# Patient Record
Sex: MALE | Race: WHITE | Employment: OTHER | URBAN - METROPOLITAN AREA
[De-identification: names, ages, dates, MRNs, and addresses within clinical notes are randomized per-mention and may not be internally consistent; named-entity substitution may affect disease eponyms.]

---

## 2019-03-21 ENCOUNTER — HOSPITAL ENCOUNTER (OUTPATIENT)
Dept: PHYSICAL THERAPY | Age: 69
Discharge: HOME | End: 2019-03-21
Payer: COMMERCIAL

## 2019-03-21 PROCEDURE — 97162 PT EVAL MOD COMPLEX 30 MIN: CPT

## 2019-03-21 PROCEDURE — 97110 THERAPEUTIC EXERCISES: CPT

## 2019-03-21 PROCEDURE — 97016 VASOPNEUMATIC DEVICE THERAPY: CPT

## 2019-03-21 NOTE — THERAPY EVALUATION
Ramesh Hadrin  : 1950  Primary: [facility] Lacy [facility] [place]*  [place] [id number] Kenroy Mehta at [address]  [phone]   [phone]       OUTPATIENT PHYSICAL THERAPY:Initial Assessment 3/21/2019    ICD-10: Treatment Diagnosis 1: adhesive capsulitis of the right shoulder (M75.01)  Treatment Diagnosis 2: right shoulder pain (M25.511)  Precautions: Type I Diabetes - patient has an insulin pump  Allergies: Patient has no allergy information on record. MD Orders: Evaluate and Treat  MEDICAL/REFERRING DIAGNOSIS:  Adhesive capsulitis of right shoulder [M75.01]   DATE OF ONSET: pain started around 2019 and has been chronic since with adhesive capsulitis  REFERRING PHYSICIAN: Anat Barrett MD  RETURN PHYSICIAN APPOINTMENT: 2019     INITIAL ASSESSMENT:  Mr. Winter Fritz is a 79 y.o. male presenting to physical therapy with complaints of right adhesive capsulitis that started around 2019 and was recently diagnosed with per Dr Ismael Arreguin. He reports a history of left frozen shoulder om  which has completely resolved with full ROM with physical therapy and manipulation under anesthesia at that time. He reports difficult with raising the arm overhead, reaching behind his back, sleeping on that side, and lifting/carrying grandchildren. He is eager to improve ROM and limit disability from ROM loss of frozen shoulder. He is a good candidate for skilled intervention with services to include manual therapy, modalities as needed, therapeutic exercises, postural re-education, and activity modification. PROBLEM LIST (Impacting functional limitations):  1. Decreased Strength  2. Decreased ADL/Functional Activities  3. Increased Pain  4. Decreased Activity Tolerance  5. Increased Fatigue  6.  Decreased Flexibility/Joint Mobility INTERVENTIONS PLANNED: (Treatment may consist of any combination of the following)  1. Cold  2. Cryotherapy  3. Electrical Stimulation  4. Family Education  5. Heat  6. Home Exercise Program (HEP)  7. Manual Therapy  8. Neuromuscular Re-education/Strengthening  9. Range of Motion (ROM)  10. Therapeutic Exercise/Strengthening  11. Transcutaneous Electrical Nerve Stimulation (TENS)  12. Transfer Training  13. Ultrasound (US)   TREATMENT PLAN:  Effective Dates: 3/21/2019 TO 5/16/2019. Frequency/Duration: 1-2 times a week for 8 weeks  GOALS: (Goals have been discussed and agreed upon with patient.)  Short-Term Functional Goals: Time Frame: 3/21/2019 to 4/18/2019  1. [place] with home exercise program without verbal cuing by therapist.  2. Improve active flexion and abduction to at least 140 degrees in order to normalize overhead activity. 3. Improve Apley's IR to at least T10 in order to don/doff belt with less dysfunction. Discharge Goals: Time Frame: 3/21/2019 to 5/16/2019  1. Improve pain to 3/10 at the most with reaching behind the back, sleeping on the right side, and lifting/carrying grandchildren. 2. Improve active and passive elevation to at least 170 degrees in order to normalize overhead activites. 3. Improve Apley's IR to at least T8 in order to don/doff belt and insulin pump. 4. Improve ER (at 0 degrees) to 70 degrees and ER (at 90 degrees) to 90 degrees. 5. Improve strength grossly of glenohumeral and scapulothoracic joint by at least 1 full muscle grade. 6. Improve maintenance of ROM and proper progression of ROM at home in order for patient to transition to independent HEP. 7. Improve DASH to 15/55. Outcome Measure: Tool Used: Disabilities of the Arm, Shoulder and Hand (DASH) Questionnaire - Quick Version  Score:  Initial: 27/55  Most Recent: X/55 (Date: -- )   Interpretation of Score: The DASH is designed to measure the activities of daily living in person's with upper extremity dysfunction or pain.   Each section is scored on a 1-5 scale, 5 representing the greatest disability. The scores of each section are added together for a total score of 55. Medical Necessity:   · Patient is expected to demonstrate progress in range of motion, coordination and functional technique to improve overhead activities, lifting/carrying, behind the back activities, and reduce pain. · Skilled intervention continues to be required due to weakness, decreased ROM, functional limitations, and pain. Reason for Services/Other Comments:  · Patient continues to require skilled intervention due to increasing complexity of exercises. Total Evaluation Duration: 30 minutes    Rehabilitation Potential For Stated Goals: Good  Regarding Aga  therapy, I certify that the treatment plan above will be carried out by a therapist or under their direction. Thank you for this referral,  Adalberto Rivera PT     Referring Physician Signature: Griselda Koo MD              Date                     PAIN/SUBJECTIVE:    Initial: Pain Intensity 1: 7  Pain Location 1: Shoulder  Pain Orientation 1: Right  Post Session:  5/10    HISTORY:    History of Injury/Illness (Reason for Referral):  Mr. Zay Betts is a 79 y.o. male presenting to physical therapy with complaints of right adhesive capsulitis that started around January 1st 2019 and was recently diagnosed with per Dr Margot [place]. He reports a history of left frozen shoulder om 2014 which has completely resolved with full ROM with physical therapy and manipulation under anesthesia at that time. He reports difficult with raising the arm overhead, reaching behind his back, sleeping on that side, and lifting/carrying grandchildren. He is eager to improve ROM and limit disability from ROM loss of frozen shoulder. Past Medical History/Comorbidities:   Mr. Zay Betts has a past medical history of Type I Diabetes, hyperlipidemia, and hypertension all controlled with medication.   Social History/Living Environment:    Patient lives at home with spouse in a two story home and reports assistance from her with any painful activities. Prior Level of Function/Work/Activity:  Independent without dysfunction. Patient is retired. He is very active with grandchildren and activities around the house. Dominant Side:         RIGHT    Ambulatory/Rehab Services H2 Model Falls Risk Assessment    Risk Factors:       (1)  Gender [Male] Ability to Rise from Chair:       (1)  Pushes up, successful in one attempt    Falls Prevention Plan:       No modifications necessary    Total: (5 or greater = High Risk): 2    ©2010 [facility] [employer]. All Rights Reserved. [facility] Patent #[id number]. Federal Law prohibits the replication, distribution or use without written permission from [facility] [employer]    Current Medications:      No current outpatient medications on file. Date Last Reviewed:  3/21/2019    Number of Personal Factors/Comorbidities that affect the Plan of Care (history of type I diabetes): 1-2: MODERATE COMPLEXITY    EXAMINATION:      Observation/Postural and Gait Assessment:  Patient sits with significant forward head, forward shoulders, and thoracic kyphosis that are reversible with cuing. Visual inspection of shoulder reveals no significant deformity. Palpation:  Gross tenderness to palpation of right shoulder. Flexibility significiantly limited of pectoralis minor and major on the right. ROM: AROM to 130 degrees on the right with pain at end range with moderate scapular hike, 170 degrees on the left without scapular hike. PROM in degrees Left Right   Shoulder flexion 180° 140°   Shoulder abduction  180° 140°   Shoulder internal rotation (IR)  (measured at 45 ° abduction) 70° 40°   Shoulder external rotation (ER)  (measured 0° abduction) 70° 45°   ER (measured at 90° degrees of abduction) 90° 70°   Apley's scratch test (functional IR AROM) T8 sacrum     Passive Accessory Motion: Severe limitations in all planes.     Strength:   Manual Muscle Test (out of 5) Left Right   Shoulder scaption 4 4   Shoulder ER 4 4+   Shoulder IR 4 4+   Teres Minor 4 4   Infraspinatus 4 4     Special Tests:  ROM passively and actively limited in all planes which is indicative of adhesive capsulitis. Neurological Screen:  Myotomes: Key muscle strength testing for bilateral UE is WNL. Dermatomes: Sensation testing through bilateral UE for light touch is WNL. Functional Mobility:  Patient is safe and independent with most functional mobility but requires assistance with some heavy lifting and carrying. Body Structures Involved:  1. Joints  2. Muscles  3. Ligaments Body Functions Affected:  1. Sensory/Pain  2. Neuromusculoskeletal Activities and Participation Affected:  1. Learning and Applying Knowledge  2. General Tasks and Demands  3. Mobility  4. Self Care  5. Domestic Life  6.  Community, Social and [place]    Number of elements (examined above) that affect the Plan of Care: 3: MODERATE COMPLEXITY    CLINICAL PRESENTATION:    Presentation: Evolving clinical presentation with changing clinical characteristics: MODERATE COMPLEXITY    CLINICAL DECISION MAKING:    Use of outcome tool(s) and clinical judgement create a POC that gives a: Questionable prediction of patient's progress: MODERATE COMPLEXITY

## 2019-03-21 NOTE — PROGRESS NOTES
Frandy Bazzi  : 1950  Primary: Tyronbeatriz Peng [facility] [place]*  [place]: [place] [id number] [address] at [address], [address] [place] [address]  [phone]   [phone]      OUTPATIENT PHYSICAL THERAPY: Daily Treatment Note 3/21/2019    Pre-treatment Symptoms/Complaints:  Patient reported being eager to improve overall ROM. Pain: Initial: Pain Intensity 1: 7  Pain Location 1: Shoulder  Pain Orientation 1: Right  Post Session:  5/10   Medications Last Reviewed:  3/21/2019  Precautions: Type I Diabetes - patient has an insulin pump  Date of Onset: pain started around 2019 and has been chronic since with adhesive capsulitis  Updated Objective Findings:  See evaluation note from today   TREATMENT:   THERAPEUTIC EXERCISE: (15 minutes):  Exercises per grid below to improve mobility, strength, balance and coordination. Required minimal visual, verbal and manual cues to promote proper body alignment, promote proper body posture and promote proper body mechanics. Progressed resistance, range, repetitions and complexity of movement as indicated.      Date:  3/21/2019 Date:   Date:     Activity/Exercise Parameters Parameters Parameters   Supine stick external rotation 10 sec x 10     Supine stick flexion 10 sec x 10     pulleys 10 sec x 10     Ball roll seated 10 sec x 10     Wall walk 10 sec x 10     Strap IR 5 x 30 sec               MANUAL THERAPY: (0 minutes): Joint mobilization and Soft tissue mobilization was utilized and necessary because of the patient's restricted joint motion, loss of articular motion and restricted motion of soft tissue   · Will consider supine PROM in all planes to improve ROM next session    MODALITIES: (15 minutes):      vasopneumatic in sitting to right shoulder with pillow under right arm      Treatment/Session Summary:    · Response to Treatment:  Patient reported no complaints with session and reported a good understanding of HEP. Will see patient 1 day a week per his request and will progress exercises and assess reponse to exercise. .  · Communication/Consultation:  None today  · Equipment provided today:  Patient was provided a set of over the door pulleys for home use today. · Recommendations/Intent for next treatment session: Next visit will focus on continued progression and maintenance of AROM and PROM.   Treatment Plan of Care Effective Dates: 3/21/2019 to 5/16/2019    Total Treatment Billable Duration:  60 minutes: 30 evaluation, 15 minutes exercises, 15 minutes vasopneumatic  PT Patient Time In/Time Out  Time In: 1100  Time Out: 1700 [facility], PT

## 2019-03-29 ENCOUNTER — HOSPITAL ENCOUNTER (OUTPATIENT)
Dept: PHYSICAL THERAPY | Age: 69
Discharge: HOME | End: 2019-03-29
Payer: COMMERCIAL

## 2019-03-29 PROCEDURE — 97016 VASOPNEUMATIC DEVICE THERAPY: CPT

## 2019-03-29 PROCEDURE — 97140 MANUAL THERAPY 1/> REGIONS: CPT

## 2019-03-29 PROCEDURE — 97035 APP MDLTY 1+ULTRASOUND EA 15: CPT

## 2019-03-29 PROCEDURE — 97110 THERAPEUTIC EXERCISES: CPT

## 2019-03-29 NOTE — PROGRESS NOTES
Inocencio Hodgson Lalaoggs  : 1950  Primary: Dillon Swift Of [place]*  [place] [id number] Kenroy Mehta at [address]  [phone]   [phone]      OUTPATIENT PHYSICAL THERAPY: Daily Treatment Note 3/29/2019    Pre-treatment Symptoms/Complaints:  Patient reported continued pain in the shoulder but ROM seems to be improving. Pain: Initial: Pain Intensity 1: 4  Pain Location 1: Shoulder  Pain Orientation 1: Right  Post Session:  5/10   Medications Last Reviewed:  3/29/2019  Precautions: Type I Diabetes - patient has an insulin pump  Date of Onset: pain started around 2019 and has been chronic since with adhesive capsulitis  Updated Objective Findings:  see table below    left right   External rotation (0 degrees abduction) 40 45   External rotation (90 degrees abduction) 80 70   Flexion  150 140   abduction 150 140   IR 60 40      TREATMENT:   THERAPEUTIC EXERCISE: (15 minutes):  Exercises per grid below to improve mobility, strength, balance and coordination. Required minimal visual, verbal and manual cues to promote proper body alignment, promote proper body posture and promote proper body mechanics. Progressed resistance, range, repetitions and complexity of movement as indicated.      Date:  3/21/2019 Date:  3/29/2019 Date:     Activity/Exercise Parameters Parameters Parameters   Supine stick external rotation 10 sec x 10 10 sec x 10    Supine stick flexion 10 sec x 10 10 sec x 10    pulleys 10 sec x 10 10 sec x 10    Ball roll seated 10 sec x 10 10 sec x 10    Wall walk 10 sec x 10 10 sec x 10    Strap IR 5 x 30 sec 5 x 30 sec              MANUAL THERAPY: (15 minutes): Joint mobilization and Soft tissue mobilization was utilized and necessary because of the patient's restricted joint motion, loss of articular motion and restricted motion of soft tissue   · Will consider supine PROM in all planes to improve ROM next session    MODALITIES:   · Ultrasound (10 minutes): *  Ultrasound was used today secondary to the patient having tightened structures limiting joint motion that require an increase in extensibility. Ultrasound was used today to reduce joint stiffness, increase muscle flexibility, increase tendon flexibility and increase ligament flexibility. in sitting to right shoulder, intensity 1.4, frequency 1   · Vasopneumatic (15 minutes): in sitting to right shoulder to reduce pain and improve inflammation and swelling from stretching due to frozen shoulder      Treatment/Session Summary:    · Response to Treatment:  Patient tolerated session well and reported no complaints. Required some cues to proper form with exercises - patient is only 1 day a week for therapy sessions. Continue to progress as tolerated. .  · Communication/Consultation:  None today  · Equipment provided today:  Patient was provided a set of over the door pulleys for home use today. · Recommendations/Intent for next treatment session: Next visit will focus on continued progression and maintenance of AROM and PROM.   Treatment Plan of Care Effective Dates: 3/21/2019 to 5/16/2019    Total Treatment Billable Duration:  55 minutes  PT Patient Time In/Time Out  Time In: 1055  Time Out: Rosey 226, PT

## 2019-04-05 ENCOUNTER — HOSPITAL ENCOUNTER (OUTPATIENT)
Dept: PHYSICAL THERAPY | Age: 69
Discharge: HOME | End: 2019-04-05
Payer: COMMERCIAL

## 2019-04-05 NOTE — PROGRESS NOTES
Patient reported he would be on vacation today and would not make it to therapy.     Gabriela Barksdale, MARIMART

## 2019-04-12 ENCOUNTER — HOSPITAL ENCOUNTER (OUTPATIENT)
Dept: PHYSICAL THERAPY | Age: 69
Discharge: HOME | End: 2019-04-12
Payer: COMMERCIAL

## 2019-04-12 PROCEDURE — 97140 MANUAL THERAPY 1/> REGIONS: CPT

## 2019-04-12 PROCEDURE — 97035 APP MDLTY 1+ULTRASOUND EA 15: CPT

## 2019-04-12 PROCEDURE — 97110 THERAPEUTIC EXERCISES: CPT

## 2019-04-12 NOTE — PROGRESS NOTES
Maxim Hardin  : 1950  Primary: [facility] [place]*  [place]: [place] [id number] [address] at [address]  [address] [facility] [place], [address] [place] [address]  [phone]   [phone]      OUTPATIENT PHYSICAL THERAPY: Daily Treatment Note 2019    Pre-treatment Symptoms/Complaints:  Patient reports slightly decreased pain past few days . Pain: Initial: Pain Intensity 1: 2  Pain Location 1: Shoulder  Pain Orientation 1: Right  Post Session:  2/10   Medications Last Reviewed:  2019  Precautions: Type I Diabetes - patient has an insulin pump  Date of Onset: pain started around 2019 and has been chronic since with adhesive capsulitis  Updated Objective Findings:  see table below    left right   External rotation (0 degrees abduction) 40 45   External rotation (90 degrees abduction) 80 70   Flexion  150 140   abduction 150 140   IR 60 40      TREATMENT:   THERAPEUTIC EXERCISE: (20 minutes):  Exercises per grid below to improve mobility, strength, balance and coordination. Required minimal visual, verbal and manual cues to promote proper body alignment, promote proper body posture and promote proper body mechanics. Progressed resistance, range, repetitions and complexity of movement as indicated.      Date:  3/21/2019 Date:  3/29/2019 Date:  19   Activity/Exercise Parameters Parameters Parameters   Supine stick external rotation 10 sec x 10 10 sec x 10 10 sec x 10   Supine stick flexion 10 sec x 10 10 sec x 10 10 sec x 10   pulleys 10 sec x 10 10 sec x 10 2 x 10 sec x 10   Ball roll seated 10 sec x 10 10 sec x 10 10 sec x 10   Wall walk 10 sec x 10 10 sec x 10 10 x 10 sec   Strap IR 5 x 30 sec 5 x 30 sec 6 x 30 sec   Wand abduction   1 X 10                          MANUAL THERAPY: (25 minutes): Joint mobilization and Soft tissue mobilization was utilized and necessary because of the patient's restricted joint motion, loss of articular motion and restricted motion of soft tissue   ·  Supine PROM in all planes to improve ROM   · Soft tissue mobilization to gross shoulder musculature in sitting    MODALITIES:   · Ultrasound (10 minutes): *  Ultrasound was used today secondary to the patient having tightened structures limiting joint motion that require an increase in extensibility. Ultrasound was used today to reduce joint stiffness, increase muscle flexibility, increase tendon flexibility and increase ligament flexibility. in sitting to right shoulder, intensity 1.4, frequency 1   · Vasopneumatic (0 minutes): in sitting to right shoulder to reduce pain and improve inflammation and swelling from stretching due to frozen shoulder      Treatment/Session Summary:    · Response to Treatment:  No C/O with exercises. improving ROM. · Communication/Consultation:  None today  · Equipment provided today:  None today  · Recommendations/Intent for next treatment session: Next visit will focus on continued progression and maintenance of AROM and PROM.   Treatment Plan of Care Effective Dates: 3/21/2019 to 5/16/2019    Total Treatment Billable Duration:  55 minutes  PT Patient Time In/Time Out  Time In: 1055  Time Out: ANASTACIO Mooney

## 2019-04-19 ENCOUNTER — HOSPITAL ENCOUNTER (OUTPATIENT)
Dept: PHYSICAL THERAPY | Age: 69
Discharge: HOME | End: 2019-04-19
Payer: COMMERCIAL

## 2019-04-19 PROCEDURE — 97016 VASOPNEUMATIC DEVICE THERAPY: CPT

## 2019-04-19 PROCEDURE — 97140 MANUAL THERAPY 1/> REGIONS: CPT

## 2019-04-19 PROCEDURE — 97035 APP MDLTY 1+ULTRASOUND EA 15: CPT

## 2019-04-19 PROCEDURE — 97110 THERAPEUTIC EXERCISES: CPT

## 2019-04-19 NOTE — PROGRESS NOTES
Flako Brandon  : 1950  Primary: Jaylene Huyen [facility]  [place]: [place] [id number] [address] at [address], [address]  [phone]   [phone]      OUTPATIENT PHYSICAL THERAPY: Daily Treatment Note 2019    Pre-treatment Symptoms/Complaints:  Patient reports increased tightness . Pain: Initial: Pain Intensity 1: 2  Pain Location 1: Shoulder  Pain Orientation 1: Right  Post Session:  0/10   Medications Last Reviewed:  2019  Precautions: Type I Diabetes - patient has an insulin pump  Date of Onset: pain started around 2019 and has been chronic since with adhesive capsulitis  Updated Objective Findings:  see table below    left right   External rotation (0 degrees abduction) 40 45   External rotation (90 degrees abduction) 80 70   Flexion  150 140   abduction 150 140   IR 60 40      TREATMENT:   THERAPEUTIC EXERCISE: (20 minutes):  Exercises per grid below to improve mobility, strength, balance and coordination. Required minimal visual, verbal and manual cues to promote proper body alignment, promote proper body posture and promote proper body mechanics. Progressed resistance, range, repetitions and complexity of movement as indicated.      Date:  19 Date:  3/29/2019 Date:  19   Activity/Exercise Parameters Parameters Parameters   Supine stick external rotation 10 sec x 10 10 sec x 10 10 sec x 10   Supine stick flexion 10 sec x 10 10 sec x 10 10 sec x 10   pulleys 2 min flex  2 min scaption 10 sec x 10 2 x 10 sec x 10   Ball roll seated Standing 10 sec x 10 10 sec x 10 10 sec x 10   Wall walk 10 sec x 10 10 sec x 10 10 x 10 sec   Strap IR 5 x 30 sec 5 x 30 sec 6 x 30 sec   Wand abduction 1 x 10  1 X 10                          MANUAL THERAPY: (15 minutes): Joint mobilization and Soft tissue mobilization was utilized and necessary because of the patient's restricted joint motion, loss of articular motion and restricted motion of soft tissue   ·  Supine PROM in all planes to improve ROM   ·     MODALITIES:   · Ultrasound (10 minutes): *  Ultrasound was used today secondary to the patient having tightened structures limiting joint motion that require an increase in extensibility. Ultrasound was used today to reduce joint stiffness, increase muscle flexibility, increase tendon flexibility and increase ligament flexibility. in sitting to right shoulder, intensity 1.4, frequency 1   · Vasopneumatic (15 minutes): in sitting to right shoulder to reduce pain and improve inflammation and swelling from stretching due to frozen shoulder  MHP to right shoulder in sitting x 10 minutes sitting    Treatment/Session Summary:    · Response to Treatment:  No C/O with exercises. improving ROM. · Communication/Consultation:  None today  · Equipment provided today:  None today  · Recommendations/Intent for next treatment session: Next visit will focus on continued progression and maintenance of AROM and PROM.   Treatment Plan of Care Effective Dates: 3/21/2019 to 5/16/2019    Total Treatment Billable Duration:  60 minutes  PT Patient Time In/Time Out  Time In: 1000  Time Out: 1000 [facility]

## 2019-04-26 ENCOUNTER — HOSPITAL ENCOUNTER (OUTPATIENT)
Dept: PHYSICAL THERAPY | Age: 69
Discharge: HOME | End: 2019-04-26
Payer: COMMERCIAL

## 2019-04-26 PROCEDURE — 97110 THERAPEUTIC EXERCISES: CPT

## 2019-04-26 PROCEDURE — 97140 MANUAL THERAPY 1/> REGIONS: CPT

## 2019-04-26 NOTE — PROGRESS NOTES
Olga Reevesgs  : 1950  Primary: Gabby Christy [place]  [place] [id number] at [address], [address]  [phone]   [phone]      OUTPATIENT PHYSICAL THERAPY: Daily Treatment Note 2019    Pre-treatment Symptoms/Complaints:  Patient reported he still has difficulty threading his belt through the loops of his pants. Pain: Initial: Pain Intensity 1: 4  Pain Location 1: Shoulder  Pain Orientation 1: Right  Post Session:  1/10 and decreased shoulder tightness   Medications Last Reviewed:  2019  Precautions: Type I Diabetes - patient has an insulin pump  Date of Onset: pain started around 2019 and has been chronic since with adhesive capsulitis  Updated Objective Findings:  Pt. continues to gain range of motion in right shoulder. left right   External rotation (0 degrees abduction) 40 45   External rotation (90 degrees abduction) 80 70   Flexion  150 140   abduction 150 140   IR 60 40      TREATMENT:   THERAPEUTIC EXERCISE: (30 minutes):  Exercises per grid below to improve mobility, strength, balance and coordination. Required minimal visual, verbal and manual cues to promote proper body alignment, promote proper body posture and promote proper body mechanics. Progressed resistance, range, repetitions and complexity of movement as indicated. Date:  19 Date:  2619 Date:  19   Activity/Exercise Parameters Parameters Parameters   Supine stick external rotation 10 sec x 10 10 sec x 10 10 sec x 10   Supine stick flexion 10 sec x 10 10 sec x 10 10 sec x 10   pulleys 2 min flex  2 min scaption 2 mins flexion & 2 mins scaption 2 x 10 sec x 10   Ball roll seated Standing 10 sec x 10 10 sec x 10 10 sec x 10   Wall walk 10 sec x 10 10 sec x 10 10 x 10 sec   Strap IR 5 x 30 sec 5 x 30 sec 6 x 30 sec   Wand abduction 1 x 10 X 10 reps  1 X 10    UBE   Level 4 x 3 mins fwd & 3 mins bkwd. MANUAL THERAPY: (15 minutes): Joint mobilization and Soft tissue mobilization was utilized and necessary because of the patient's restricted joint motion, loss of articular motion and restricted motion of soft tissue   ·  Supine PROM in all planes to improve ROM   ·     MODALITIES:   · Ultrasound (0 minutes): *  Ultrasound was used today secondary to the patient having tightened structures limiting joint motion that require an increase in extensibility. Ultrasound was used today to reduce joint stiffness, increase muscle flexibility, increase tendon flexibility and increase ligament flexibility. in sitting to right shoulder, intensity 1.4, frequency 1   · Vasopneumatic (15 minutes): in sitting to right shoulder to reduce pain and improve inflammation and swelling from stretching due to frozen shoulder (NO CHARGE FOR VASO)    Treatment/Session Summary:    · Response to Treatment:  Pt. was compliant with all exercises and reported decreased pain tightness in right shoulder. .  · Communication/Consultation:  None today  · Equipment provided today:  None today  · Recommendations/Intent for next treatment session: Next visit will focus on continued progression and maintenance of AROM and PROM.   Treatment Plan of Care Effective Dates: 3/21/2019 to 5/16/2019    Total Treatment Billable Duration:  60 minutes  PT Patient Time In/Time Out  Time In: 1100  Time Out: [address] Kari Marx, PTA

## 2019-05-03 ENCOUNTER — HOSPITAL ENCOUNTER (OUTPATIENT)
Dept: PHYSICAL THERAPY | Age: 69
Discharge: HOME | End: 2019-05-03
Payer: COMMERCIAL

## 2019-05-03 PROCEDURE — 97140 MANUAL THERAPY 1/> REGIONS: CPT

## 2019-05-03 PROCEDURE — 97110 THERAPEUTIC EXERCISES: CPT

## 2019-05-03 NOTE — PROGRESS NOTES
Yun Leal  : 1950  [place] Kofi Joy [place]*  [place]: [place] [id number] at [address], [place], [address] [place] [address]  [phone]   [phone]      OUTPATIENT PHYSICAL THERAPY: Daily Treatment Note 5/3/2019    Pre-treatment Symptoms/Complaints:  Patient states reaching behind his back is main C/O  Pain: Initial: Pain Intensity 1: 5  Pain Location 1: Shoulder  Pain Orientation 1: Right  Post Session:  1/10 and decreased shoulder tightness   Medications Last Reviewed:  5/3/2019  Precautions: Type I Diabetes - patient has an insulin pump  Date of Onset: pain started around 2019 and has been chronic since with adhesive capsulitis  Updated Objective Findings:  Pt. continues to gain range of motion in right shoulder. left right   External rotation (0 degrees abduction) 40 45   External rotation (90 degrees abduction) 80 70   Flexion  150 140   abduction 150 140   IR 60 40      TREATMENT:   THERAPEUTIC EXERCISE: (30 minutes):  Exercises per grid below to improve mobility, strength, balance and coordination. Required minimal visual, verbal and manual cues to promote proper body alignment, promote proper body posture and promote proper body mechanics. Progressed resistance, range, repetitions and complexity of movement as indicated. Date:  19 Date:  2619 Date:  5-3-19   Activity/Exercise Parameters Parameters Parameters   Supine stick external rotation 10 sec x 10 10 sec x 10 10 sec x 10   Supine stick flexion 10 sec x 10 10 sec x 10 10 sec x 10   pulleys 2 min flex  2 min scaption 2 mins flexion & 2 mins scaption 2 min flexion  2 min scaption   Ball roll seated Standing 10 sec x 10 10 sec x 10 10 x 10 sec   Wall walk 10 sec x 10 10 sec x 10 10 x 10 sec   Strap IR 5 x 30 sec 5 x 30 sec 5 x 30 sec   Wand abduction 1 x 10 X 10 reps  1 x 10   UBE   Level 4 x 3 mins fwd & 3 mins bkwd.   Level 5 4 min forward  4 min reverse                   MANUAL THERAPY: (15 minutes): Joint mobilization and Soft tissue mobilization was utilized and necessary because of the patient's restricted joint motion, loss of articular motion and restricted motion of soft tissue   ·  Supine PROM in all planes to improve ROM   ·     MODALITIES:   · Ultrasound (0 minutes): *  Ultrasound was used today secondary to the patient having tightened structures limiting joint motion that require an increase in extensibility. Ultrasound was used today to reduce joint stiffness, increase muscle flexibility, increase tendon flexibility and increase ligament flexibility. in sitting to right shoulder, intensity 1.4, frequency 1   · Vasopneumatic (10 minutes): in sitting to right shoulder to reduce pain and inflammation (NO CHARGE FOR VASO)    Treatment/Session Summary:    · Response to Treatment:  Improved ROM after session. · Communication/Consultation:  None today  · Equipment provided today:  None today  · Recommendations/Intent for next treatment session: Next visit will focus on continued progression and maintenance of AROM and PROM.   Treatment Plan of Care Effective Dates: 3/21/2019 to 5/16/2019    Total Treatment Billable Duration:  55 minutes  PT Patient Time In/Time Out  Time In: 1053  Time Out: [address] [facility], [facility]

## 2019-05-10 ENCOUNTER — HOSPITAL ENCOUNTER (OUTPATIENT)
Dept: PHYSICAL THERAPY | Age: 69
Discharge: HOME | End: 2019-05-10
Payer: COMMERCIAL

## 2019-05-10 PROCEDURE — 97110 THERAPEUTIC EXERCISES: CPT

## 2019-05-10 PROCEDURE — 97140 MANUAL THERAPY 1/> REGIONS: CPT

## 2019-05-10 PROCEDURE — 97035 APP MDLTY 1+ULTRASOUND EA 15: CPT

## 2019-05-10 NOTE — PROGRESS NOTES
Slick Reevesognatali  : 1950  Primary: Marcus Siddiqui [place]*  [place] [id number] at [address]  [phone]   [phone]      OUTPATIENT PHYSICAL THERAPY: Daily Treatment Note 5/10/2019    Pre-treatment Symptoms/Complaints:  Patient reports overall progress however continues to have difficulty reaching behind his back. Pain: Initial: Pain Intensity 1: 3  Pain Location 1: Shoulder  Pain Orientation 1: Right  Post Session:  1/10    Medications Last Reviewed:  5/10/2019  Precautions: Type I Diabetes - patient has an insulin pump  Date of Onset: pain started around 2019 and has been chronic since with adhesive capsulitis  Updated Objective Findings:  None Today    left right   External rotation (0 degrees abduction) 40 45   External rotation (90 degrees abduction) 80 70   Flexion  150 140   abduction 150 140   IR 60 40      TREATMENT:   THERAPEUTIC EXERCISE: (20 minutes):  Exercises per grid below to improve mobility, strength, balance and coordination. Required minimal visual, verbal and manual cues to promote proper body alignment, promote proper body posture and promote proper body mechanics. Progressed resistance, range, repetitions and complexity of movement as indicated. Date:  5-10-19 Date:  2619 Date:  5-3-19   Activity/Exercise Parameters Parameters Parameters   Supine stick external rotation  10 sec x 10 10 sec x 10   Supine stick flexion  10 sec x 10 10 sec x 10   pulleys 2 min flexion  2 min scaption 2 mins flexion & 2 mins scaption 2 min flexion  2 min scaption   Ball roll seated  10 sec x 10 10 x 10 sec   Wall walk 10 x 10 sec 10 sec x 10 10 x 10 sec   Strap IR 5 x 30 sec 5 x 30 sec 5 x 30 sec   Wand abduction 1 x 12 X 10 reps  1 x 10   UBE  3 min forward  3 min reverse Level 4 x 3 mins fwd & 3 mins bkwd.   Level 5 4 min forward  4 min reverse                   MANUAL THERAPY: (15 minutes): Joint mobilization and Soft tissue mobilization was utilized and necessary because of the patient's restricted joint motion, loss of articular motion and restricted motion of soft tissue   ·  Supine PROM in all planes to improve ROM   ·     MODALITIES:   · Ultrasound (10 minutes): *  Ultrasound was used today secondary to the patient having tightened structures limiting joint motion that require an increase in extensibility. Ultrasound was used today to reduce joint stiffness, increase muscle flexibility, increase tendon flexibility and increase ligament flexibility. in sitting to right shoulder, intensity 1.4, frequency 1   · Vasopneumatic (10 minutes): in sitting to right shoulder to reduce pain and inflammation (NO CHARGE FOR VASO)    Treatment/Session Summary:    · Response to Treatment:  Improved ROM after session. · Communication/Consultation:  None today  · Equipment provided today:  None today  · Recommendations/Intent for next treatment session: Next visit will focus on continued progression and maintenance of AROM and PROM.   Treatment Plan of Care Effective Dates: 3/21/2019 to 5/16/2019    Total Treatment Billable Duration:  55 minutes  PT Patient Time In/Time Out  Time In: 1055  Time Out: [address] Louis Valentino, ANASTACIO

## 2019-05-13 ENCOUNTER — HOSPITAL ENCOUNTER (OUTPATIENT)
Dept: PHYSICAL THERAPY | Age: 69
Discharge: HOME | End: 2019-05-13
Payer: COMMERCIAL

## 2019-05-13 PROCEDURE — 97140 MANUAL THERAPY 1/> REGIONS: CPT

## 2019-05-13 PROCEDURE — 97035 APP MDLTY 1+ULTRASOUND EA 15: CPT

## 2019-05-13 PROCEDURE — 97110 THERAPEUTIC EXERCISES: CPT

## 2019-05-13 NOTE — PROGRESS NOTES
Tunde Hardin  : 1950  Primary: Lana Garcia [place]*  [place]: [place] [id number] at [address], [place], [address] [place] [address]  [phone]   [phone]      OUTPATIENT PHYSICAL THERAPY: Daily Treatment Note 2019    Pre-treatment Symptoms/Complaints:  Patient reports overall progress by at least 90% and feels ready for discharge today. Pain: Initial: Pain Intensity 1: 1  Pain Location 1: Shoulder  Pain Orientation 1: Right  Post Session:  0-1/10    Medications Last Reviewed:  2019  Precautions: Type I Diabetes - patient has an insulin pump  Date of Onset: pain started around 2019 and has been chronic since with adhesive capsulitis  Updated Objective Findings:  None Today    left right   External rotation (0 degrees abduction) 40 45   External rotation (90 degrees abduction) 80 90 (from 70)   Flexion  150 150 (From 140)   abduction 150 150 (From 140)   IR 60 60 (from 40)      TREATMENT:   THERAPEUTIC EXERCISE: (30 minutes):  Exercises per grid below to improve mobility, strength, balance and coordination. Required minimal visual, verbal and manual cues to promote proper body alignment, promote proper body posture and promote proper body mechanics. Progressed resistance, range, repetitions and complexity of movement as indicated. Date:  5-10-19 Date:  2019 Date:  5-3-19   Activity/Exercise Parameters Parameters Parameters   Supine stick external rotation  10 sec x 10 10 sec x 10   Supine stick flexion  10 sec x 10 10 sec x 10   pulleys 2 min flexion  2 min scaption 2 mins flexion & 2 mins scaption 2 min flexion  2 min scaption   Ball roll seated  10 sec x 10 10 x 10 sec   Wall walk 10 x 10 sec 10 sec x 10 10 x 10 sec   Strap IR 5 x 30 sec 5 x 30 sec 5 x 30 sec   Wand abduction 1 x 12 X 10 reps  1 x 10   UBE  3 min forward  3 min reverse Level 4 x 3 mins fwd & 3 mins bkwd.   Level 5 4 min forward  4 min reverse                   MANUAL THERAPY: (20 minutes): Joint mobilization and Soft tissue mobilization was utilized and necessary because of the patient's restricted joint motion, loss of articular motion and restricted motion of soft tissue   ·  Supine PROM in all planes to improve ROM   · Supine posterior and inferior joint mobilization in neutral grade III-IV    MODALITIES:   · Ultrasound (10 minutes): *  Ultrasound was used today secondary to the patient having tightened structures limiting joint motion that require an increase in extensibility. Ultrasound was used today to reduce joint stiffness, increase muscle flexibility, increase tendon flexibility and increase ligament flexibility. in sitting to right shoulder, intensity 1.4, frequency 1     Treatment/Session Summary:    · Response to Treatment:  Patient tolerated session well and reported feeling ready for discharge today. .  · Communication/Consultation:  None today  · Equipment provided today:  None today  · Recommendations/Intent for next treatment session: Patient is discharged from therapy at this time.   Treatment Plan of Care Effective Dates: 3/21/2019 to 5/16/2019    Total Treatment Billable Duration:  55 minutes  PT Patient Time In/Time Out  Time In: 1000  Time Out: 1200 Chris Singh, PT

## 2019-05-13 NOTE — THERAPY DISCHARGE
Germaine Reeveschandrakantnatali  : 1950  Primary: Ana Clancy [place]*  [place] [id number] [address] at [address], [address] [place] [address] Street  [phone]   [phone]       OUTPATIENT PHYSICAL [facility] 2019    ICD-10: Treatment Diagnosis 1: adhesive capsulitis of the right shoulder (M75.01)  Treatment Diagnosis 2: right shoulder pain (M25.511)  Precautions: Type I Diabetes - patient has an insulin pump  Allergies: Patient has no allergy information on record. MD Orders: Evaluate and Treat  MEDICAL/REFERRING DIAGNOSIS:  Adhesive capsulitis of right shoulder [M75.01]   DATE OF ONSET: pain started around 2019 and has been chronic since with adhesive capsulitis  REFERRING PHYSICIAN: Isabell Boast, MD  RETURN PHYSICIAN APPOINTMENT: 2019     INITIAL ASSESSMENT:  Mr. Sonali Arita is a 79 y.o. male presenting to physical therapy with complaints of right adhesive capsulitis that started around 2019 and was recently diagnosed with per Dr Randee Doyle. He reports a history of left frozen shoulder om  which has completely resolved with full ROM with physical therapy and manipulation under anesthesia at that time. He reports difficult with raising the arm overhead, reaching behind his back, sleeping on that side, and lifting/carrying grandchildren. He is eager to improve ROM and limit disability from ROM loss of frozen shoulder. Patient has been seen for 8 sessions of therapy from 3/21/2019 to 2019 with significant success. He reports less pain in the shoulder and steady improvements in ROM especially behind the back. He has met most preset goals and is discharged from therapy at this time. PROBLEM LIST (Impacting functional limitations):  1. Decreased Strength  2. Decreased ADL/Functional Activities  3. Increased Pain  4. Decreased Activity Tolerance  5. Increased Fatigue  6.  Decreased Flexibility/Joint Mobility INTERVENTIONS PLANNED: (Treatment may consist of any combination of the following)  1. Cold  2. Cryotherapy  3. Electrical Stimulation  4. Family Education  5. Heat  6. Home Exercise Program (HEP)  7. Manual Therapy  8. Neuromuscular Re-education/Strengthening  9. Range of Motion (ROM)  10. Therapeutic Exercise/Strengthening  11. Transcutaneous Electrical Nerve Stimulation (TENS)  12. Transfer Training  13. Ultrasound (US)   TREATMENT PLAN:  Effective Dates: 3/21/2019 TO 5/16/2019. Frequency/Duration: Patient has been seen for 8 sessions of therapy from 3/21/2019 to 5/13/2019 with significant success. He reports less pain in the shoulder and steady improvements in ROM especially behind the back. He has met most preset goals and is discharged from therapy at this time. GOALS: (Goals have been discussed and agreed upon with patient.)  Short-Term Functional Goals: Time Frame: 5/13/2019 to 4/18/2019  1. [place] with home exercise program without verbal cuing by therapist. - GOAL MET  2. Improve active flexion and abduction to at least 140 degrees in order to normalize overhead activity. - GOAL MET  3. Improve Apley's IR to at least T10 in order to don/doff belt with less dysfunction. - GOAL MET  Discharge Goals: Time Frame: 5/13/2019 to 5/16/2019  1. Improve pain to 3/10 at the most with reaching behind the back, sleeping on the right side, and lifting/carrying grandchildren. - GOAL MET  2. Improve active and passive elevation to at least 170 degrees in order to normalize overhead activites. - ALMOST MET  3. Improve Apley's IR to at least T8 in order to don/doff belt and insulin pump. - NOT MET  4. Improve ER (at 0 degrees) to 70 degrees and ER (at 90 degrees) to 90 degrees. - ALMOST MET  5. Improve strength grossly of glenohumeral and scapulothoracic joint by at least 1 full muscle grade. - GOAL MET  6.  Improve maintenance of ROM and proper progression of ROM at home in order for patient to transition to independent HEP. - GOAL MET  7. Improve DASH to 15/55. - NOT MET    Outcome Measure: Tool Used: Disabilities of the Arm, Shoulder and Hand (DASH) Questionnaire - Quick Version  Score:  Initial: 27/55  Most Recent: 27/55 (Date: 523/1870)   Interpretation of Score: The DASH is designed to measure the activities of daily living in person's with upper extremity dysfunction or pain. Each section is scored on a 1-5 scale, 5 representing the greatest disability. The scores of each section are added together for a total score of 55. OBJECTIVE MEASUREMENTS:  Observation/Postural and Gait Assessment:  Patient sits with moderate (from significant) forward head, forward shoulders, and thoracic kyphosis that are reversible with cuing. Visual inspection of shoulder reveals no significant deformity. Palpation:  Decreased gross tenderness to palpation of right shoulder. Flexibility moderately (From significiantly) limited of pectoralis minor and major on the right. ROM: AROM to 130 degrees on the right with pain at end range with moderate scapular hike, 170 degrees on the left without scapular hike. PROM in degrees Left Right   Shoulder flexion 180° 150 (from 140°)   Shoulder abduction  180° 150 (From 140°)   Shoulder internal rotation (IR)  (measured at 45 ° abduction) 70° 60 (From 40°)   Shoulder external rotation (ER)  (measured 0° abduction) 70° 45°   ER (measured at 90° degrees of abduction) 90° 90 (From 70°)   Apley's scratch test (functional IR AROM) T8 T10 (from sacrum)     Passive Accessory Motion: Moderate (From moderate to severe) limitations in all planes. Strength:   Manual Muscle Test (out of 5) Left Right   Shoulder scaption 4 4+ (From 4)   Shoulder ER 4 4+   Shoulder IR 4 4+   Teres Minor 4 4+ (From 4)   Infraspinatus 4 4+ (From 4)     Special Tests:  ROM passively and actively limited in all planes which is indicative of adhesive capsulitis.     Functional Mobility: Patient is safe and independent with most functional mobility but requires assistance with some heavy lifting and carrying. Medical Necessity:   Patient is discharged at this time. Rehabilitation Potential For Stated Goals: Good  Regarding Aga  therapy, I certify that the treatment plan above will be carried out by a therapist or under their direction.   Thank you for this referral,  Albin Lopes, PT

## 2019-05-17 ENCOUNTER — APPOINTMENT (OUTPATIENT)
Dept: PHYSICAL THERAPY | Age: 69
End: 2019-05-17
Payer: COMMERCIAL

## 2019-08-29 VITALS — BODY MASS INDEX: 28.04 KG/M2 | HEIGHT: 72 IN | WEIGHT: 207 LBS

## 2019-08-29 RX ORDER — LISINOPRIL 40 MG/1
40 TABLET ORAL
COMMUNITY

## 2019-08-29 RX ORDER — SIMVASTATIN 40 MG/1
40 TABLET, FILM COATED ORAL
COMMUNITY

## 2019-08-29 RX ORDER — LANOLIN ALCOHOL/MO/W.PET/CERES
1000 CREAM (GRAM) TOPICAL DAILY
COMMUNITY

## 2019-08-29 NOTE — PERIOP NOTES
Patient verified name and . Order for consent not found in EHR. Type 1B surgery, PAT phone assessment complete. Orders not received. Labs per surgeon: no orders received. Labs per anesthesia protocol: none. Pt states he is seen by nephrology but denies history of CKD. CKD listed as diagnosis in care everywhere. Left voicemail with medical records at [facility] requesting last office note to be faxed to [phone]. Patient answered medical/surgical history questions at their best of ability. All prior to admission medications documented in [facility]. Patient instructed to take the following medications the day of surgery according to anesthesia guidelines with a small sip of water: none. Hold all vitamins/supplements 7 days prior to surgery and NSAIDS 5 days prior to surgery. Pt instructed to leave basal rate only on insulin pump. Patient instructed on the following:  Arrive at A Entrance, time of arrival to be called the day before by 1700  NPO after midnight including gum, mints, and ice chips  Responsible adult must drive patient to the hospital, stay during surgery, and patient will need supervision 24 hours after anesthesia  Use antibacterial soap in shower the night before surgery and on the morning of surgery  All piercings must be removed prior to arrival.    Leave all valuables (money and jewelry) at home but bring insurance card and ID on  DOS. Do not wear make-up, nail polish, lotions, cologne, perfumes, powders, or oil on skin. Patient teach back successful and patient demonstrates knowledge of instruction.

## 2019-08-30 ENCOUNTER — HOSPITAL ENCOUNTER (OUTPATIENT)
Dept: SURGERY | Age: 69
Discharge: HOME | End: 2019-08-30

## 2019-08-30 NOTE — PERIOP NOTES
Received faxed nephrology office note (6/19/19) and lab report (6/6/19). Placed on chart for reference.

## 2019-09-05 ENCOUNTER — ANESTHESIA EVENT (OUTPATIENT)
Dept: SURGERY | Age: 69
End: 2019-09-05
Payer: COMMERCIAL

## 2019-09-06 ENCOUNTER — HOSPITAL ENCOUNTER (OUTPATIENT)
Age: 69
Setting detail: OUTPATIENT SURGERY
Discharge: HOME | End: 2019-09-06
Attending: ORTHOPAEDIC SURGERY | Admitting: ORTHOPAEDIC SURGERY
Payer: COMMERCIAL

## 2019-09-06 ENCOUNTER — ANESTHESIA (OUTPATIENT)
Dept: SURGERY | Age: 69
End: 2019-09-06
Payer: COMMERCIAL

## 2019-09-06 VITALS
RESPIRATION RATE: 16 BRPM | TEMPERATURE: 97.4 F | HEART RATE: 81 BPM | WEIGHT: 207 LBS | DIASTOLIC BLOOD PRESSURE: 64 MMHG | SYSTOLIC BLOOD PRESSURE: 125 MMHG | OXYGEN SATURATION: 93 % | BODY MASS INDEX: 28.07 KG/M2

## 2019-09-06 LAB
GLUCOSE BLD STRIP.AUTO-MCNC: 186 MG/DL (ref 65–100)
GLUCOSE BLD STRIP.AUTO-MCNC: 231 MG/DL (ref 65–100)

## 2019-09-06 PROCEDURE — 76010010054 HC POST OP PAIN BLOCK: Performed by: ORTHOPAEDIC SURGERY

## 2019-09-06 PROCEDURE — 77030019908 HC STETH ESOPH SIMS -A: Performed by: ANESTHESIOLOGY

## 2019-09-06 PROCEDURE — 76010000149 HC OR TIME 1 TO 1.5 HR: Performed by: ORTHOPAEDIC SURGERY

## 2019-09-06 PROCEDURE — 74011250637 HC RX REV CODE- 250/637

## 2019-09-06 PROCEDURE — 77030037088 HC TUBE ENDOTRACH ORAL NSL COVD-A: Performed by: ANESTHESIOLOGY

## 2019-09-06 PROCEDURE — 74011250636 HC RX REV CODE- 250/636

## 2019-09-06 PROCEDURE — A4565 SLINGS: HCPCS | Performed by: ORTHOPAEDIC SURGERY

## 2019-09-06 PROCEDURE — 77030018836 HC SOL IRR NACL ICUM -A: Performed by: ORTHOPAEDIC SURGERY

## 2019-09-06 PROCEDURE — 76942 ECHO GUIDE FOR BIOPSY: CPT | Performed by: ORTHOPAEDIC SURGERY

## 2019-09-06 PROCEDURE — 74011250636 HC RX REV CODE- 250/636: Performed by: ANESTHESIOLOGY

## 2019-09-06 PROCEDURE — 77030016570 HC BLNKT BAIR HGGR 3M -B: Performed by: ANESTHESIOLOGY

## 2019-09-06 PROCEDURE — 77030027138 HC INCENT SPIROMETER -A: Performed by: ORTHOPAEDIC SURGERY

## 2019-09-06 PROCEDURE — 77030003666 HC NDL SPINAL BD -A: Performed by: ORTHOPAEDIC SURGERY

## 2019-09-06 PROCEDURE — 76060000033 HC ANESTHESIA 1 TO 1.5 HR: Performed by: ORTHOPAEDIC SURGERY

## 2019-09-06 PROCEDURE — 77030010428: Performed by: ORTHOPAEDIC SURGERY

## 2019-09-06 PROCEDURE — 82962 GLUCOSE BLOOD TEST: CPT

## 2019-09-06 PROCEDURE — 77030003602 HC NDL NRV BLK BBMI -B: Performed by: ANESTHESIOLOGY

## 2019-09-06 PROCEDURE — 77030002933 HC SUT MCRYL J&J -A: Performed by: ORTHOPAEDIC SURGERY

## 2019-09-06 PROCEDURE — 74011000250 HC RX REV CODE- 250

## 2019-09-06 PROCEDURE — 74011000250 HC RX REV CODE- 250: Performed by: ANESTHESIOLOGY

## 2019-09-06 PROCEDURE — 76210000006 HC OR PH I REC 0.5 TO 1 HR: Performed by: ORTHOPAEDIC SURGERY

## 2019-09-06 PROCEDURE — 76210000020 HC REC RM PH II FIRST 0.5 HR: Performed by: ORTHOPAEDIC SURGERY

## 2019-09-06 PROCEDURE — 77030006891 HC BLD SHV RESECT STRY -B: Performed by: ORTHOPAEDIC SURGERY

## 2019-09-06 PROCEDURE — 77030039425 HC BLD LARYNG TRULITE DISP TELE -A: Performed by: ANESTHESIOLOGY

## 2019-09-06 PROCEDURE — 77030011263 HC ELECTRD ACRPLSTY CNMD -B: Performed by: ORTHOPAEDIC SURGERY

## 2019-09-06 RX ORDER — NALOXONE HYDROCHLORIDE 0.4 MG/ML
0.04 INJECTION, SOLUTION INTRAMUSCULAR; INTRAVENOUS; SUBCUTANEOUS
Status: DISCONTINUED | OUTPATIENT
Start: 2019-09-06 | End: 2019-09-06 | Stop reason: HOSPADM

## 2019-09-06 RX ORDER — MIDAZOLAM HYDROCHLORIDE 1 MG/ML
2 INJECTION, SOLUTION INTRAMUSCULAR; INTRAVENOUS ONCE
Status: COMPLETED | OUTPATIENT
Start: 2019-09-06 | End: 2019-09-06

## 2019-09-06 RX ORDER — GLYCOPYRROLATE 0.2 MG/ML
INJECTION INTRAMUSCULAR; INTRAVENOUS AS NEEDED
Status: DISCONTINUED | OUTPATIENT
Start: 2019-09-06 | End: 2019-09-06 | Stop reason: HOSPADM

## 2019-09-06 RX ORDER — OXYCODONE HYDROCHLORIDE 5 MG/1
5 TABLET ORAL
Status: DISCONTINUED | OUTPATIENT
Start: 2019-09-06 | End: 2019-09-06 | Stop reason: HOSPADM

## 2019-09-06 RX ORDER — ONDANSETRON 2 MG/ML
INJECTION INTRAMUSCULAR; INTRAVENOUS AS NEEDED
Status: DISCONTINUED | OUTPATIENT
Start: 2019-09-06 | End: 2019-09-06 | Stop reason: HOSPADM

## 2019-09-06 RX ORDER — LIDOCAINE HYDROCHLORIDE 10 MG/ML
0.1 INJECTION INFILTRATION; PERINEURAL AS NEEDED
Status: DISCONTINUED | OUTPATIENT
Start: 2019-09-06 | End: 2019-09-06 | Stop reason: HOSPADM

## 2019-09-06 RX ORDER — HYDROMORPHONE HYDROCHLORIDE 2 MG/ML
0.5 INJECTION, SOLUTION INTRAMUSCULAR; INTRAVENOUS; SUBCUTANEOUS
Status: DISCONTINUED | OUTPATIENT
Start: 2019-09-06 | End: 2019-09-06 | Stop reason: HOSPADM

## 2019-09-06 RX ORDER — SODIUM CHLORIDE, SODIUM LACTATE, POTASSIUM CHLORIDE, CALCIUM CHLORIDE 600; 310; 30; 20 MG/100ML; MG/100ML; MG/100ML; MG/100ML
100 INJECTION, SOLUTION INTRAVENOUS CONTINUOUS
Status: DISCONTINUED | OUTPATIENT
Start: 2019-09-06 | End: 2019-09-06 | Stop reason: HOSPADM

## 2019-09-06 RX ORDER — FENTANYL CITRATE 50 UG/ML
100 INJECTION, SOLUTION INTRAMUSCULAR; INTRAVENOUS ONCE
Status: COMPLETED | OUTPATIENT
Start: 2019-09-06 | End: 2019-09-06

## 2019-09-06 RX ORDER — MIDAZOLAM HYDROCHLORIDE 1 MG/ML
2 INJECTION, SOLUTION INTRAMUSCULAR; INTRAVENOUS
Status: DISCONTINUED | OUTPATIENT
Start: 2019-09-06 | End: 2019-09-06 | Stop reason: HOSPADM

## 2019-09-06 RX ORDER — ROCURONIUM BROMIDE 10 MG/ML
INJECTION, SOLUTION INTRAVENOUS AS NEEDED
Status: DISCONTINUED | OUTPATIENT
Start: 2019-09-06 | End: 2019-09-06 | Stop reason: HOSPADM

## 2019-09-06 RX ORDER — PROPOFOL 10 MG/ML
INJECTION, EMULSION INTRAVENOUS AS NEEDED
Status: DISCONTINUED | OUTPATIENT
Start: 2019-09-06 | End: 2019-09-06 | Stop reason: HOSPADM

## 2019-09-06 RX ORDER — LIDOCAINE HYDROCHLORIDE 20 MG/ML
INJECTION, SOLUTION EPIDURAL; INFILTRATION; INTRACAUDAL; PERINEURAL AS NEEDED
Status: DISCONTINUED | OUTPATIENT
Start: 2019-09-06 | End: 2019-09-06 | Stop reason: HOSPADM

## 2019-09-06 RX ORDER — NEOSTIGMINE METHYLSULFATE 1 MG/ML
INJECTION INTRAVENOUS AS NEEDED
Status: DISCONTINUED | OUTPATIENT
Start: 2019-09-06 | End: 2019-09-06 | Stop reason: HOSPADM

## 2019-09-06 RX ADMIN — ROCURONIUM BROMIDE 45 MG: 10 INJECTION, SOLUTION INTRAVENOUS at 07:54

## 2019-09-06 RX ADMIN — LIDOCAINE HYDROCHLORIDE 0.1 ML: 10 INJECTION, SOLUTION INFILTRATION; PERINEURAL at 06:16

## 2019-09-06 RX ADMIN — ONDANSETRON 4 MG: 2 INJECTION INTRAMUSCULAR; INTRAVENOUS at 07:54

## 2019-09-06 RX ADMIN — MIDAZOLAM 2 MG: 1 INJECTION INTRAMUSCULAR; INTRAVENOUS at 07:12

## 2019-09-06 RX ADMIN — LIDOCAINE HYDROCHLORIDE 60 MG: 20 INJECTION, SOLUTION EPIDURAL; INFILTRATION; INTRACAUDAL; PERINEURAL at 07:54

## 2019-09-06 RX ADMIN — SODIUM CHLORIDE, SODIUM LACTATE, POTASSIUM CHLORIDE, AND CALCIUM CHLORIDE: 600; 310; 30; 20 INJECTION, SOLUTION INTRAVENOUS at 09:16

## 2019-09-06 RX ADMIN — PROPOFOL 200 MG: 10 INJECTION, EMULSION INTRAVENOUS at 07:54

## 2019-09-06 RX ADMIN — NEOSTIGMINE METHYLSULFATE 3 MG: 1 INJECTION INTRAVENOUS at 08:59

## 2019-09-06 RX ADMIN — SODIUM CHLORIDE, SODIUM LACTATE, POTASSIUM CHLORIDE, AND CALCIUM CHLORIDE 100 ML/HR: 600; 310; 30; 20 INJECTION, SOLUTION INTRAVENOUS at 06:07

## 2019-09-06 RX ADMIN — SODIUM CHLORIDE, SODIUM LACTATE, POTASSIUM CHLORIDE, AND CALCIUM CHLORIDE: 600; 310; 30; 20 INJECTION, SOLUTION INTRAVENOUS at 07:46

## 2019-09-06 RX ADMIN — GLYCOPYRROLATE 0.4 MG: 0.2 INJECTION INTRAMUSCULAR; INTRAVENOUS at 08:59

## 2019-09-06 RX ADMIN — FENTANYL CITRATE 100 MCG: 50 INJECTION INTRAMUSCULAR; INTRAVENOUS at 07:12

## 2019-09-06 NOTE — DISCHARGE INSTRUCTIONS
Arthroscopic Frozen shoulder Release Discharge Instructions    ACTIVITY:   - You may use your operated arm as much as you pain allows you to use it  - Use arm sling until you are comfortable without it  - It's OK to bathe or shower as you normally would. Your dressing is waterproof. DIET:  - Clear liquids until no nausea or vomiting; then light diet for the first day  - Advance to regular diet as you feel like it  - If nausea and vomiting occurs,use the phenergan prescription you were given by Dr Laurita Barrett. If the phenergan doesn't work please call our doctor on call. (Call [phone] if it  is after regular office hours)    PAIN:  - Take pain medication(s) as directed by the prescription you were given  - Remember that it often helps to take acetaminophen with your pain medicine IF IT DOES NOT CONTAIN ACETAMINOPHEN. You may take up to 8 extra-strength tylenol or up to 12 regular tylenol per 24 hours.    - You may also use ibuprofen 800 mg every six hours or aleve 440 mg every 8 hours IN ADDITION TO your prescribed pain medicine  - Call Dr Laurita Barrett if pain is NOT adequately relieved by medication    DRESSING CARE:  - There is no need to change your dressing before your follow up visit    Emma 18 IF:  - You notice excessive bleeding that does not stop after holding mild pressure over the area  - Temperature of 100.5°F or greater  - Redness, excessive swelling or bruising, and/or green or yellow, smelly discharge from incision    AFTER ANESTHESIA:  - For the next 12 hours: DO NOT drive, drink alcoholic beverages, or make important decisions  - Be aware of dizziness following anesthesia and while taking pain medication(s)    MEDICATIONS:  Continue your usual home medications as previously prescribed unless you were told otherwise    Signed:  Caitlin Arias MD  9/6/2019  7:56 AM

## 2019-09-06 NOTE — ANESTHESIA POSTPROCEDURE EVALUATION
Procedure(s):  RIGHT SHOULDER ARTHROSCOPIC FROZEN SHOULDER RELEASE GEN/ SCAL. general    Anesthesia Post Evaluation        Patient location during evaluation: PACU  Patient participation: complete - patient participated  Level of consciousness: awake  Pain management: satisfactory to patient  Airway patency: patent  Anesthetic complications: no  Cardiovascular status: hemodynamically stable  Respiratory status: spontaneous ventilation  Hydration status: euvolemic  Post anesthesia nausea and vomiting:  none    Bedside sat 95%.   Vitals Value Taken Time   /64 9/6/2019 10:05 AM   Temp 36.3 °C (97.4 °F) 9/6/2019  9:14 AM   Pulse 81 9/6/2019 10:05 AM   Resp 16 9/6/2019 10:05 AM   SpO2 93 % 9/6/2019 10:05 AM

## 2019-09-06 NOTE — ANESTHESIA PROCEDURE NOTES
Peripheral Block    Start time: 9/6/2019 7:12 AM  End time: 9/6/2019 7:17 AM  Performed by: Janell Oneal MD  Authorized by: Janell Oneal MD       Pre-procedure: Indications: at surgeon's request, post-op pain management and procedure for pain    Preanesthetic Checklist: patient identified, risks and benefits discussed, site marked, timeout performed, anesthesia consent given and patient being monitored    Timeout Time: 07:12          Block Type:   Block Type: Interscalene  Laterality:  Right  Monitoring:  Standard ASA monitoring, continuous pulse ox, frequent vital sign checks, heart rate, oxygen and responsive to questions  Injection Technique:  Single shot  Procedures: ultrasound guided and nerve stimulator    Prep: chlorhexidine    Needle Type:  Stimuplex (4 Inch)  Needle Gauge:  20 G  Needle Localization:  Ultrasound guidance and nerve stimulator  Motor Response: minimal motor response >0.4 mA      Assessment:  Number of attempts:  1  Injection Assessment:  Incremental injection every 5 mL, local visualized surrounding nerve on ultrasound, negative aspiration for blood, no paresthesia, no intravascular symptoms and ultrasound image on chart  Patient tolerance:  Patient tolerated the procedure well with no immediate complications  3 cc 1% lidocaine injected at site of needle insertion. Needle inserted and placed in close proximity to the nerve under real time ultrasound guidance. Ultrasound was used to visualize the spread of local anesthetic in close proximity to the nerve being blocked. The nerve appeared anatomically normal and there were no abnormal findings.

## 2019-09-06 NOTE — ANESTHESIA PREPROCEDURE EVALUATION
Relevant Problems   No relevant active problems       Anesthetic History   No history of anesthetic complications            Review of Systems / Medical History  Pertinent labs reviewed    Pulmonary  Within defined limits                 Neuro/Psych   Within defined limits           Cardiovascular    Hypertension              Exercise tolerance: >4 METS     GI/Hepatic/Renal         Renal disease: CRI       Endo/Other    Diabetes (insulin pump): using insulin    Arthritis     Other Findings              Physical Exam    Airway  Mallampati: II  TM Distance: 4 - 6 cm  Neck ROM: normal range of motion   Mouth opening: Normal     Cardiovascular  Regular rate and rhythm,  S1 and S2 normal,  no murmur, click, rub, or gallop             Dental  No notable dental hx       Pulmonary  Breath sounds clear to auscultation               Abdominal  GI exam deferred       Other Findings            Anesthetic Plan    ASA: 2  Anesthesia type: general      Post-op pain plan if not by surgeon: peripheral nerve block single    Induction: Intravenous  Anesthetic plan and risks discussed with: Patient and Spouse

## 2019-09-06 NOTE — OP NOTES
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 PROCEDURE PERFORMED:  Arthroscopic frozen shoulder release. SURGEON:  Juan Salamanca MD.    ASSISTANT:      ANESTHESIA:      COMPLICATIONS:  none    SPECIMENS REMOVED:      IMPLANTS:      ESTIMATED BLOOD LOSS:  Less than 20 mL. FINDINGS:  His preoperative range was as follows:  ABD 70, E 30, ERS 5, ERA 70, F 80. His postoperative range was: , ERA 95, ERS 30, E 50,  F 140. PROCEDURE IN DETAIL:  In the operating room, he was anesthetized. In the beach-chair position his right shoulder was prepped and draped in a sterile fashion. I distended the shoulder with irrigant from a posterior puncture. I placed an 18-gauge needle through the rotator cuff interval and introduced the arthroscope from the posterior approach. The initial survey indicated pristine and normal for age humeral head cartilage, slight age related change of articular cartilage of the glenoid. The subscapularis was youthful. The biceps tendon was youthful and no rotator cuff tendon tear was seen. He had the classic bright red inflammation of the capsular ligaments consistent with the diagnosis. Through the rotator cuff interval, I inserted a trocar, through that electrocautery and under direct vision transected the superior, middle and inferior glenohumeral ligaments. Intermittently, gentle abduction traction was applied. Ultimately the range described above was accomplished after complete transection under direct vision of those ligaments. All apparatus was removed and the wounds were closed with interrupted Monocryl in the subcu, Mastisol and Steri-Strips on the skin, followed by sterile waterproof dressing.   He tolerated the procedure well. His blood loss was estimated at less than 20 mL. He left the recovery room in good condition after we applied a sling. Saulo Cabezas MD      [id number]  D:  09/06/2019 9:19  T:  09/06/2019 9:30  JOB #:  [id number]  CC:   Sergio Cruz MD

## 2019-09-09 ENCOUNTER — HOSPITAL ENCOUNTER (OUTPATIENT)
Dept: PHYSICAL THERAPY | Age: 69
Discharge: HOME | End: 2019-09-09
Payer: COMMERCIAL

## 2019-09-09 PROCEDURE — 97016 VASOPNEUMATIC DEVICE THERAPY: CPT

## 2019-09-09 PROCEDURE — 97161 PT EVAL LOW COMPLEX 20 MIN: CPT

## 2019-09-09 PROCEDURE — 97140 MANUAL THERAPY 1/> REGIONS: CPT

## 2019-09-09 PROCEDURE — 97110 THERAPEUTIC EXERCISES: CPT

## 2019-09-09 NOTE — THERAPY EVALUATION
Celia Hardin  : 1950  [place] [employer] [place]  [place]: [place] [id number] at [address], [address] [place] [address]  [phone]   [phone]       OUTPATIENT PHYSICAL THERAPY:Initial Assessment 2019    ICD-10: Treatment Diagnosis: adhesive capsulitis of R shoulder (M75.01)                Treatment Diagnosis 2: pain in R shoulder (M25.511)                Treatment Diagnosis 3: stiffness in R shoulder (M25.611)  Precautions: Diabetes- with insulin pump, hypertension  Allergies: Milk containing products and Sulfa (sulfonamide antibiotics)   TREATMENT PLAN:  Effective Dates: 2019 TO 10/9/2019 (30 days). Frequency/Duration: 2 times a week for 30 Day(s) MEDICAL/REFERRING DIAGNOSIS:  Adhesive capsulitis of right shoulder [M75.01]   DATE OF ONSET: Patient underwent arthroscopic frozen shoulder release on 19. REFERRING PHYSICIAN: Doretha Núñez MD MD Orders: Evaluate and Treat   Return MD Appointment: 19     INITIAL ASSESSMENT:  Mr. Elana Esparza is a 81 y.o. male presenting to physical therapy with complaints of R shoulder pain and stiffness s/p arthroscopic frozen shoulder release on 19. Patient was seen in our clinic in 2019 and did well with conservative treatment. He reports continuing with his exercises at home after being done with that plan of care, but getting busy with traveling and taking care of family members and his shoulder tightened up again. Patient states the decided surgery was his best option and he is feeling better since Friday. Spoke at length with patient and he is aware of the importance of HEP, continue with stretching to keep and gain ROM, and overall progression of therapy.  Patient presents with increased pain, decreased strength, decreased ROM, decreased flexibility, impaired posture, impaired overhead reach, impaired transfer ability, decreased activity tolerance, and overall impaired functional mobility. Patient is a good candidate for skilled physical therapy interventions to include manual therapy, therapeutic exercise, transfer training, postural re-education, body mechanics training, and pain modalities as needed. PROBLEM LIST (Impacting functional limitations):  1. Decreased Strength  2. Decreased ADL/Functional Activities  3. Decreased Transfer Abilities  4. Increased Pain  5. Decreased Activity Tolerance  6. Decreased Pacing Skills  7. Decreased Work Simplification/Energy Conservation Techniques  8. Decreased Flexibility/Joint Mobility INTERVENTIONS PLANNED: (Treatment may consist of any combination of the following)  1. Bed Mobility  2. Cold  3. Cryotherapy  4. Electrical Stimulation  5. Family Education  6. Heat  7. Home Exercise Program (HEP)  8. Manual Therapy  9. Neuromuscular Re-education/Strengthening  10. Range of Motion (ROM)  11. Therapeutic Activites  12. Therapeutic Exercise/Strengthening  13. Transfer Training     GOALS: (Goals have been discussed and agreed upon with patient.)  Short-Term Functional Goals: Time Frame: 9/9/2019 to 9/26/19  1. Patient demonstrates independence with home exercise program without verbal cueing provided by therapist.   2. Patient will report no more than 2/10 R shoulder pain at rest in order to demonstrate improved self pain control and tolerance. 3. Patient will demonstrate improved upright posture with decreased scapular protraction and rounded shoulders in order to improve clearance for overhead activities. 4. Patient will improve AAROM R shoulder flexion to 150 degrees in order to demonstrate improved functional ROM. Discharge Goals: Time Frame: 9/9/2019 to 10/9/19  1.  Patient will improve R shoulder external rotation ROM to at least 45 degrees in order to demonstrate symmetrical ROM compared to L UE.  2. Patient will improve gross R UE strength to at least 4+/5 in order to improve functional use and activities. 3. Patient will improve R shoulder functional internal and external ROM to L1 and C7, respectively, in order to improve ability to don/ doff belt and wash hair. 4. Patient will be able to dress/ bathe with minimal to no difficulty or modifications in order to return to prior level of function. 5. Patient will demonstrate R shoulder active ROM of at least 140 degrees in order to perform functional overhead activities. 6. Patient will improve Disability of the Arm, Shoulder, and Hand score to 20/55 from 27/55. Outcome Measure: Tool Used: Disabilities of the Arm, Shoulder and Hand (DASH) Questionnaire - Quick Version  Score:  Initial: 27/55  Most Recent: X/55 (Date: -- )   Interpretation of Score: The DASH is designed to measure the activities of daily living in person's with upper extremity dysfunction or pain. Each section is scored on a 1-5 scale, 5 representing the greatest disability. The scores of each section are added together for a total score of 55. Medical Necessity:   · Patient is expected to demonstrate progress in strength, range of motion, coordination and functional technique to increase independence with dressing and bathing and improve safety during reaching and overhead activities. · Skilled intervention continues to be required due to decreased functional ROM and independence. Reason for Services/Other Comments:  · Patient continues to require skilled intervention due to decreased functional ROM and strength hindering return to prior level of activities and independence. Total Evaluation Duration: 20 minutes    Rehabilitation Potential For Stated Goals: Good  Regarding Aga  therapy, I certify that the treatment plan above will be carried out by a therapist or under their direction.   Thank you for this referral,  Claudine Washburn, PT , DPT, OMT-C  Referring Physician Signature: Salina Hebert MD              Date                     PAIN/SUBJECTIVE: Initial: Pain Intensity 1: 4  Pain Location 1: Shoulder  Pain Orientation 1: Right  Post Session:  4/10    HISTORY:    History of Injury/Illness (Reason for Referral):  Mr. Apolinar Johnson is a 81 y.o. male presenting to physical therapy with complaints of R shoulder pain and stiffness s/p arthroscopic frozen shoulder release on 8/6/19. Patient was seen in our clinic in March 2019 and did well with conservative treatment. He reports continuing with his exercises at home after being done with that plan of care, but getting busy with traveling and taking care of family members and his shoulder tightened up again. Patient states the decided surgery was his best option and he is feeling better since Friday. Spoke at length with patient and he is aware of the importance of HEP, continue with stretching to keep and gain ROM, and overall progression of therapy. Patient presents with increased pain, decreased strength, decreased ROM, decreased flexibility, impaired posture, impaired overhead reach, impaired transfer ability, decreased activity tolerance, and overall impaired functional mobility. Past Medical History/Comorbidities:   Mr. Apolinar Johnson  has a past medical history of Arthritis, CKD (chronic kidney disease), Diabetes ([address] Hypertension, and Thromboembolus ([facility] [address] Mr. Apolinar Johnson  has a past surgical history that includes hx orthopaedic and hx colonoscopy. Social History/Living Environment:     Patient lives in a private, one story home with his spouse. Prior Level of Function/Work/Activity:  Not working. Dominant Side:         RIGHT  Personal Factors:          Sex:  male        Age:  81 y.o. Ambulatory/Rehab Services H2 Model Falls Risk Assessment    Risk Factors:       (1)  Gender [Male] Ability to Rise from Chair:       (1)  Pushes up, successful in one attempt    Falls Prevention Plan:       No modifications necessary    Total: (5 or greater = High Risk): 2    ©2010 [employer]. All Rights Reserved.  [facility] Patent #[id number]. Federal Law prohibits the replication, distribution or use without written permission from [facility]    Current Medications:        Current Outpatient Medications:     lisinopril (PRINIVIL, ZESTRIL) 40 mg tablet, Take 40 mg by mouth nightly., Disp: , Rfl:     simvastatin (ZOCOR) 40 mg tablet, Take 40 mg by mouth nightly., Disp: , Rfl:     cyanocobalamin 1,000 mcg tablet, Take 1,000 mcg by mouth daily. , Disp: , Rfl:     insulin pump (PATIENT SUPPLIED) mis, by SubCUTAneous route daily. Indications: novalog, Disp: , Rfl:     Date Last Reviewed:  9/9/2019    Number of Personal Factors/Comorbidities that affect the Plan of Care: 1-2: MODERATE COMPLEXITY    EXAMINATION:    Observation/Orthostatic Postural Assessment:          Patient with R scapular depression and protraction, forward head, rounded shoulders, increased thoracic kyphosis, decreased lumbar lordosis. Waterproof dressing intact over incision sites. No signs/ symptoms of infection noted. Palpation:          Moderate tenderness to palpation over gross R shoulder and incision sites. ROM:    AROM/ PROM Left (degrees) Right (degrees)   Shoulder Flexion 160 120   Shoulder Abduction 150 85   Shoulder Internal Rotation  70 50   Functional Internal Rotation T10 R SI joint   Shoulder External Rotation 45 15   Functional External Rotation T2 Back of head     Strength: Motion Tested Left   (*/5) Right  (*/5)   Shoulder Flexion 5 4+   Scaption 5 4+   Shoulder Abduction 5 4   Shoulder Internal Rotation  5 4+   Shoulder External Rotation 5 4   Elbow Flexion 5 5   Elbow Extension 5 5     Special Tests:          None performed due to acuity of surgery  Passive Accessory Motion:         None performed due to acuity of surgery  Neurological Screen:              Myotomes: Key muscle strength testing through bilateral UE is [facility]. Dermatomes: Sensation to light touch for bilateral UE is intact from C3 to T2.    Reflexes: Biceps (C5): 0 bilaterally         Brachioradialis (C6): 0 bilaterally        Triceps (C7): 0 bilaterally  Abnormal reflexes: Clonus: Mayes: negative bilaterally    Functional Mobility:         Patient requires modifications and increased time for dressing and bathing. Impaired overhead reach of R UE, but improved since surgery. Decreased R UE strength. Balance:          Sitting and standing balance grossly intact. Body Structures Involved:  1. Bones  2. Joints  3. Muscles  4. Ligaments Body Functions Affected:  1. Sensory/Pain  2. Neuromusculoskeletal  3. Movement Related Activities and Participation Affected:  1. General Tasks and Demands  2. Mobility  3. Self Care  4. Domestic Life  5.  Community, Social and [place]    Number of elements (examined above) that affect the Plan of Care: 1-2: LOW COMPLEXITY    CLINICAL PRESENTATION:    Presentation: Stable and uncomplicated: LOW COMPLEXITY    CLINICAL DECISION MAKING:    Use of outcome tool(s) and clinical judgement create a POC that gives a: Questionable prediction of patient's progress: MODERATE COMPLEXITY

## 2019-09-09 NOTE — PROGRESS NOTES
Brooke Hardin  : 1950  [place] [employer]  [place]: [place] [id number] at [address], [address] [place] [address] Street  [phone]   [phone]      OUTPATIENT PHYSICAL THERAPY: Daily Treatment Note 2019    ICD-10: Treatment Diagnosis: adhesive capsulitis of R shoulder (M75.01)                Treatment Diagnosis 2: pain in R shoulder (M25.511)                Treatment Diagnosis 3: stiffness in R shoulder (M25.611)  Precautions: Diabetes- with insulin pump, hypertension  Allergies: Milk containing products and Sulfa (sulfonamide antibiotics)   TREATMENT PLAN:  Effective Dates: 2019 TO 10/9/2019 (30 days). Frequency/Duration: 2 times a week for 30 Day(s) MEDICAL/REFERRING DIAGNOSIS:  Adhesive capsulitis of right shoulder [M75.01]   DATE OF ONSET: Patient underwent arthroscopic frozen shoulder release on 19. REFERRING PHYSICIAN: Antonio Will MD MD Orders: Evaluate and Treat   Return MD Appointment: 19     Pre-treatment Symptoms/Complaints:  Patient reports increased ROM since surgery on Friday. States he is limited with overhead activities and some ADLs. Pain: Initial: Pain Intensity 1: 4  Pain Location 1: Shoulder  Pain Orientation 1: Right  Post Session:  4/10   Medications Last Reviewed:  2019  Updated Objective Findings:  See evaluation note from today   TREATMENT:   THERAPEUTIC EXERCISE: (15 minutes):  Exercises per grid below to improve mobility, strength, coordination and dynamic movement of shoulder - right to improve functional reaching and overhead activites. Required moderate visual, verbal and manual cues to promote proper body alignment, promote proper body posture and promote proper body mechanics. Progressed resistance, range, repetitions and complexity of movement as indicated.      Date:  2019 Date:   Date:     Activity/Exercise Parameters Parameters Parameters   Scapular retractions X 10     Wand external rotation Supine, 10 x 10 seconds     Wand flexion Supine, 10 x 10 seconds     Wand abduction Standing, 10 x 10 seconds     Pulley 5 minutes     Towel stretch PLEASE ADD NEXT SESSION             Time spent with patient reviewing proper muscle recruitment and technique with exercises. MANUAL THERAPY: (15 minutes): Joint mobilization and Soft tissue mobilization was utilized and necessary because of the patient's restricted joint motion, loss of articular motion and restricted motion of soft tissue   Supine PROM to R shoulder all directions with end range holds to tolerance to maintain and improve ROM   Gentle oscillations for pain control    MODALITIES: (10 minutes):      Vasopneumatic compression device to R shoulder with patient in sitting to decrease pain and edema following surgical procedure     HEP: As above; handouts given to patient for all exercises. Treatment/Session Summary:    · Response to Treatment:  Patient with good performance and understanding of HEP. Expressed understanding of importance of ROM activities and consistency with exercises at home. · Baseline vitals (9/9/2019): BP: 166/88-- Please re-check BP next sesssion  · Communication/Consultation:  Spoke with patient regarding plan of care, intensity and frequence of exercises and progression of therapy  · Equipment provided today:  Patient given handout with above exercises for HEP  · Recommendations/Intent for next treatment session: Next visit will focus on ROM all directions of R shoulder, passive, active assistive, and active. Progression of postural and UE strengthening as tolerated.     Total Treatment Billable Duration:  60 minutes: 20 evaluation, 15 therapeutic exercise, 15 manual, 10 vasopneumatic compression  PT Patient Time In/Time Out  Time In: 9657  Time Out: 969 [facility], PT

## 2019-09-13 ENCOUNTER — HOSPITAL ENCOUNTER (OUTPATIENT)
Dept: PHYSICAL THERAPY | Age: 69
Discharge: HOME | End: 2019-09-13
Payer: COMMERCIAL

## 2019-09-13 PROCEDURE — 97140 MANUAL THERAPY 1/> REGIONS: CPT

## 2019-09-13 PROCEDURE — 97110 THERAPEUTIC EXERCISES: CPT

## 2019-09-13 PROCEDURE — 97016 VASOPNEUMATIC DEVICE THERAPY: CPT

## 2019-09-13 NOTE — PROGRESS NOTES
Howard Hardin  : 1950  [place] [employer]  [place]: [place] [id number] at [place] [address], [address] [place] [address], [address] [place] [address]  [phone]   [phone]      OUTPATIENT PHYSICAL THERAPY: Daily Treatment Note 2019    ICD-10: Treatment Diagnosis: adhesive capsulitis of R shoulder (M75.01)                Treatment Diagnosis 2: pain in R shoulder (M25.511)                Treatment Diagnosis 3: stiffness in R shoulder (M25.611)  Precautions: Diabetes- with insulin pump, hypertension  Allergies: Milk containing products and Sulfa (sulfonamide antibiotics)   TREATMENT PLAN:  Effective Dates: 2019 TO 10/9/2019 (30 days). Frequency/Duration: 2 times a week for 30 Day(s) MEDICAL/REFERRING DIAGNOSIS:  Adhesive capsulitis of right shoulder [M75.01]   DATE OF ONSET: Patient underwent arthroscopic frozen shoulder release on 19. REFERRING PHYSICIAN: Liliam Drummond MD MD Orders: Evaluate and Treat   Return MD Appointment: 19     Pre-treatment Symptoms/Complaints:  Patient reported \"Today is the worst day I have had yet since the surgery. \"  \"I must have slept on it last night. \"     Pain: Initial: Pain Intensity 1: 7  Pain Location 1: Shoulder  Pain Orientation 1: Right  Post Session:  4/10 less pain and stiffness   Medications Last Reviewed:  2019  Updated Objective Findings:  guarded right shoulder initially   TREATMENT:   THERAPEUTIC EXERCISE: (30 minutes):  Exercises per grid below to improve mobility, strength, coordination and dynamic movement of shoulder - right to improve functional reaching and overhead activites. Required moderate visual, verbal and manual cues to promote proper body alignment, promote proper body posture and promote proper body mechanics. Progressed resistance, range, repetitions and complexity of movement as indicated.      Date:  2019 Date:  19 Date:     Activity/Exercise Parameters Parameters Parameters   Scapular retractions X 10 X 20 reps 5 sec hold     Wand external rotation Supine, 10 x 10 seconds Supine x 10 reps x 10 sec hold     Wand flexion Supine, 10 x 10 seconds Supine x 10 reps x 10 sec hold     Wand abduction Standing, 10 x 10 seconds X 20 reps x 10 sec hold standing     Pulley 5 minutes X 8 mins flexion, scaption, & abduction    Towel stretch PLEASE ADD NEXT SESSION Prolonged stretches at end range x 3 mins with green strap    Shoulder shrugs  X 20 reps 5 sec hold     Finger ladder  X 10 reps x 10 sec hold     Shoulder rolls  bkwd  X 20 reps     Standing ER & IR   X 20 reps with wand                  Time spent with patient reviewing proper muscle recruitment and technique with exercises. MANUAL THERAPY: (15 minutes): Joint mobilization and Soft tissue mobilization was utilized and necessary because of the patient's restricted joint motion, loss of articular motion and restricted motion of soft tissue   Supine PROM to R shoulder all directions with end range holds to tolerance to maintain and improve ROM   Gentle oscillations for pain control    MODALITIES: (15 minutes):      Vasopneumatic compression device to R shoulder with patient in sitting to decrease pain and edema following surgical procedure     HEP: As above; handouts given to patient for all exercises. Treatment/Session Summary:    · Response to Treatment:  Pt. was compliant with all exercises and reported less pain and tightness in right shoulder . · Baseline vitals (9/9/2019): BP: 166/88-- Pt's blood pressure today 145/90. · Communication/Consultation:  Spoke with patient regarding plan of care, intensity and frequence of exercises and progression of therapy  · Equipment provided today:  Patient given handout with above exercises for HEP  · Recommendations/Intent for next treatment session: Next visit will focus on ROM all directions of R shoulder, passive, active assistive, and active.  Progression of postural and UE strengthening as tolerated.     Total Treatment Billable Duration: 60 mins   PT Patient Time In/Time Out  Time In: 1000  Time Out: 6060 [facility]

## 2019-09-17 ENCOUNTER — HOSPITAL ENCOUNTER (OUTPATIENT)
Dept: PHYSICAL THERAPY | Age: 69
Discharge: HOME | End: 2019-09-17
Payer: COMMERCIAL

## 2019-09-17 PROCEDURE — 97140 MANUAL THERAPY 1/> REGIONS: CPT

## 2019-09-17 PROCEDURE — 97110 THERAPEUTIC EXERCISES: CPT

## 2019-09-17 NOTE — PROGRESS NOTES
Brooke Hardin  : 1950  [place] [employer] [place]  [place]: [place] [id number] at [place] [address], [place], [address] [place] [address]  [phone]   [phone]      OUTPATIENT PHYSICAL THERAPY: Daily Treatment Note 2019    ICD-10: Treatment Diagnosis: adhesive capsulitis of R shoulder (M75.01)                Treatment Diagnosis 2: pain in R shoulder (M25.511)                Treatment Diagnosis 3: stiffness in R shoulder (M25.611)  Precautions: Diabetes- with insulin pump, hypertension  Allergies: Milk containing products and Sulfa (sulfonamide antibiotics)   TREATMENT PLAN:  Effective Dates: 2019 TO 10/9/2019 (30 days). Frequency/Duration: 2 times a week for 30 Day(s) MEDICAL/REFERRING DIAGNOSIS:  Adhesive capsulitis of right shoulder [M75.01]   DATE OF ONSET: Patient underwent arthroscopic frozen shoulder release on 19. REFERRING PHYSICIAN: Antonio Will MD MD Orders: Evaluate and Treat   Return MD Appointment: 19     Pre-treatment Symptoms/Complaints:  Patient reports minimal pain today. Pain: Initial: Pain Intensity 1: 1  Pain Location 1: Shoulder  Pain Orientation 1: Right  Post Session:  1/10    Medications Last Reviewed:  2019  Updated Objective Findings:  None Today   TREATMENT:   THERAPEUTIC EXERCISE: (30 minutes):  Exercises per grid below to improve mobility, strength, coordination and dynamic movement of shoulder - right to improve functional reaching and overhead activites. Required moderate visual, verbal and manual cues to promote proper body alignment, promote proper body posture and promote proper body mechanics. Progressed resistance, range, repetitions and complexity of movement as indicated.      Date:  2019 Date:  19 Date:  19   Activity/Exercise Parameters Parameters Parameters   Scapular retractions X 10 X 20 reps 5 sec hold     Wand external rotation Supine, 10 x 10 seconds Supine x 10 reps x 10 sec hold  Supine x 10   Wand flexion Supine, 10 x 10 seconds Supine x 10 reps x 10 sec hold  Supine x 10   Wand abduction Standing, 10 x 10 seconds X 20 reps x 10 sec hold standing  Standing x 10   Pulley 5 minutes X 8 mins flexion, scaption, & abduction 2 minutes flex  2 minutes scaption  2 minutes abduction   Towel stretch PLEASE ADD NEXT SESSION Prolonged stretches at end range x 3 mins with green strap Strap x 10   Shoulder shrugs  X 20 reps 5 sec hold     Finger ladder  X 10 reps x 10 sec hold  1 x 10   Shoulder rolls  bkwd  X 20 reps     Standing ER & IR   X 20 reps with wand 1 x 10                 Time spent with patient reviewing proper muscle recruitment and technique with exercises. MANUAL THERAPY: (15 minutes): Joint mobilization and Soft tissue mobilization was utilized and necessary because of the patient's restricted joint motion, loss of articular motion and restricted motion of soft tissue   Supine PROM to R shoulder all directions with end range holds to tolerance to maintain and improve ROM   Gentle oscillations for pain control    MODALITIES: (15 minutes):      Vasopneumatic compression device to R shoulder with patient in sitting to decrease pain and edema following surgical procedure Not today  IFC electrical stimulation with heat x 10 minutes to right shoulder in sitting  Cold pack x 5 minutes after exericses    HEP: As above; handouts given to patient for all exercises. Treatment/Session Summary:    · Response to Treatment:  Improved ROM after session. · Baseline vitals (9/9/2019): BP: 166/88-- . · Communication/Consultation:  None today  · Equipment provided today:  None today  · Recommendations/Intent for next treatment session: Next visit will focus on ROM all directions of R shoulder, passive, active assistive, and active. Progression of postural and UE strengthening as tolerated.     Total Treatment Billable Duration: 60 mins   PT Patient Time In/Time Out  Time In: 0700  Time Out: 9734 Leander Marx, PTA

## 2019-09-18 ENCOUNTER — HOSPITAL ENCOUNTER (OUTPATIENT)
Dept: PHYSICAL THERAPY | Age: 69
Discharge: HOME | End: 2019-09-18
Payer: COMMERCIAL

## 2019-09-18 PROCEDURE — 97140 MANUAL THERAPY 1/> REGIONS: CPT

## 2019-09-18 PROCEDURE — 97110 THERAPEUTIC EXERCISES: CPT

## 2019-09-18 NOTE — PROGRESS NOTES
Emily Chun Scroggs  : 1950  [place] [employer]  [place]: [place] [id number] at [address], [address]  [phone]   [phone]      OUTPATIENT PHYSICAL THERAPY: Daily Treatment Note 2019    ICD-10: Treatment Diagnosis: adhesive capsulitis of R shoulder (M75.01)                Treatment Diagnosis 2: pain in R shoulder (M25.511)                Treatment Diagnosis 3: stiffness in R shoulder (M25.611)  Precautions: Diabetes- with insulin pump, hypertension  Allergies: Milk containing products and Sulfa (sulfonamide antibiotics)   TREATMENT PLAN:  Effective Dates: 2019 TO 10/9/2019 (30 days). Frequency/Duration: 2 times a week for 30 Day(s) MEDICAL/REFERRING DIAGNOSIS:  Adhesive capsulitis of right shoulder [M75.01]   DATE OF ONSET: Patient underwent arthroscopic frozen shoulder release on 19. REFERRING PHYSICIAN: Justice Wood MD MD Orders: Evaluate and Treat   Return MD Appointment: 10/29/19     Pre-treatment Symptoms/Complaints:  Patient reports getting his dressing off yesterday at the doctor's office. Minimal complaints of pain and performing exercises daily. Pain: Initial: Pain Intensity 1: 1  Pain Location 1: Shoulder  Pain Orientation 1: Right  Post Session:  1/10    Medications Last Reviewed:  2019  Updated Objective Findings:  Some limitations noted in R shoulder external rotation   TREATMENT:   THERAPEUTIC EXERCISE: (40 minutes):  Exercises per grid below to improve mobility, strength, coordination and dynamic movement of shoulder - right to improve functional reaching and overhead activites. Required moderate visual, verbal and manual cues to promote proper body alignment, promote proper body posture and promote proper body mechanics. Progressed resistance, range, repetitions and complexity of movement as indicated.      Date:  19 Date:  19 Date:  19 Activity/Exercise Parameters Parameters Parameters   Scapular retractions --- X 20 reps 5 sec hold     Wand external rotation Supine, 15 x 10 seconds different pictures Supine x 10 reps x 10 sec hold  Supine x 10   Wand flexion Supine, 10 x 10 seconds Supine x 10 reps x 10 sec hold  Supine x 10   Wand abduction Standing, 10 x 10 seconds X 20 reps x 10 sec hold standing  Standing x 10   Pulley 5 minutes total, flexion, abduction, scaption X 8 mins flexion, scaption, & abduction 2 minutes flex  2 minutes scaption  2 minutes abduction   Towel stretch Adduction only with wand 10 x 10 seconds  Strap 15 x 10 seconds Prolonged stretches at end range x 3 mins with green strap Strap x 10   Finger ladder 10 x 10 seconds X 10 reps x 10 sec hold  1 x 10   Sleeper stretch Internal and external rotation 10 x 10 seconds each     Thoracic extension stretch Blue bolster, seated, 10 x 10 seconds     Band row Red, 2 x 10     Band internal rotation Yellow x 10     Band external rotation Yellow x 10       Time spent with patient reviewing proper muscle recruitment and technique with exercises. MANUAL THERAPY: (15 minutes): Joint mobilization and Soft tissue mobilization was utilized and necessary because of the patient's restricted joint motion, loss of articular motion and restricted motion of soft tissue   Supine PROM to R shoulder all directions with end range holds to tolerance to maintain and improve ROM, emphasis on external rotation   Gentle oscillations for pain control    MODALITIES: (0 minutes):      none today     HEP: As above; handouts given to patient for all exercises. Treatment/Session Summary:    · Response to Treatment:  Good tolerance for gentle strengthening today. Improving with ROM overall, but some limitations noted in external rotation. Spoke with patient regardind proper intensity and technique with stretching and emphasizing external rotation while on vacation. .  · Baseline vitals (9/9/2019): BP: 166/88  · Communication/Consultation:  None today  · Equipment provided today:  None today  · Recommendations/Intent for next treatment session: Next visit will focus on ROM all directions of R shoulder, passive, active assistive, and active. Progression of postural and UE strengthening as tolerated.     Total Treatment Billable Duration: 55 minutes  PT Patient Time In/Time Out  Time In: 0700  Time Out: 0800  Shane Delong PT

## 2019-09-26 ENCOUNTER — HOSPITAL ENCOUNTER (OUTPATIENT)
Dept: PHYSICAL THERAPY | Age: 69
Discharge: HOME | End: 2019-09-26
Payer: COMMERCIAL

## 2019-09-26 PROCEDURE — 97016 VASOPNEUMATIC DEVICE THERAPY: CPT

## 2019-09-26 PROCEDURE — 97110 THERAPEUTIC EXERCISES: CPT

## 2019-09-26 PROCEDURE — 97140 MANUAL THERAPY 1/> REGIONS: CPT

## 2019-09-26 NOTE — PROGRESS NOTES
Rishabh Emerson Scroggs  : 1950  [place] [employer] [place]  [place]: [place] [id number] at [place] [address], [place], [address] [place] [address]  [phone]   [phone]      OUTPATIENT PHYSICAL THERAPY: Daily Treatment Note 2019    ICD-10: Treatment Diagnosis: adhesive capsulitis of R shoulder (M75.01)                Treatment Diagnosis 2: pain in R shoulder (M25.511)                Treatment Diagnosis 3: stiffness in R shoulder (M25.611)  Precautions: Diabetes- with insulin pump, hypertension  Allergies: Milk containing products and Sulfa (sulfonamide antibiotics)   TREATMENT PLAN:  Effective Dates: 2019 TO 10/9/2019 (30 days). Frequency/Duration: 2 times a week for 30 Day(s) MEDICAL/REFERRING DIAGNOSIS:  Adhesive capsulitis of right shoulder [M75.01]   DATE OF ONSET: Patient underwent arthroscopic frozen shoulder release on 19. REFERRING PHYSICIAN: Mo Dickey MD MD Orders: Evaluate and Treat   Return MD Appointment: 10/29/19     Pre-treatment Symptoms/Complaints:  Patient reported improvements since surgery and is happy he had it done. Pain: Initial: Pain Intensity 1: 0  Pain Location 1: Shoulder  Pain Orientation 1: Right  Post Session:  0-1/10    Medications Last Reviewed:  2019  Updated Objective Findings:  Some limitations noted in R shoulder external rotation   TREATMENT:   THERAPEUTIC EXERCISE: (30 minutes):  Exercises per grid below to improve mobility, strength, coordination and dynamic movement of shoulder - right to improve functional reaching and overhead activites. Required moderate visual, verbal and manual cues to promote proper body alignment, promote proper body posture and promote proper body mechanics. Progressed resistance, range, repetitions and complexity of movement as indicated.      Date:  19 Date:  19 Date:  19   Activity/Exercise Parameters Parameters Parameters   UBE --- 2 forward, 2 backward with good posture    Scapular retractions --- ---     Wand external rotation Supine, 15 x 10 seconds different pictures Supine x 10 reps x 10 sec hold  Supine x 10   Wand flexion Supine, 10 x 10 seconds Supine x 10 reps x 10 sec hold  Supine x 10   Wand abduction Standing, 10 x 10 seconds X 20 reps x 10 sec hold standing  Standing x 10   Pulley 5 minutes total, flexion, abduction, scaption X 8 mins flexion, scaption, & abduction 2 minutes flex  2 minutes scaption  2 minutes abduction   Towel stretch Adduction only with wand 10 x 10 seconds  Strap 15 x 10 seconds Prolonged stretches at end range x 3 mins with green strap Strap x 10   Finger ladder 10 x 10 seconds X 10 reps x 10 sec hold  1 x 10   Sleeper stretch Internal and external rotation 10 x 10 seconds each     Thoracic extension stretch Blue bolster, seated, 10 x 10 seconds ---    Band row Red, 2 x 10 ---    Band internal rotation Yellow x 10 ---    Band external rotation Yellow x 10 ---    Standing doorway unilateral external rotation stretch --- Instructed for home use and in the shower, 5 x 30 sec      Time spent with patient reviewing proper muscle recruitment and technique with exercises. MANUAL THERAPY: (15 minutes): Joint mobilization and Soft tissue mobilization was utilized and necessary because of the patient's restricted joint motion, loss of articular motion and restricted motion of soft tissue   Supine PROM to R shoulder all directions with end range holds to tolerance to maintain and improve ROM, emphasis on external rotation   Gentle oscillations for pain control   Supine subscapularis release to improve external rotation   Supine contract relax into internal rotation from full external rotation to improve external rotation    MODALITIES: (15 minutes):      vasopneumatic in sitting with pillow under arm for pain and swelling     HEP: As above; handouts given to patient for all exercises.     Treatment/Session Summary: · Response to Treatment:  Patient tolerated session well and requested ice today so we finished today's session with ice. ROM improved in all planes. .  · Baseline vitals (9/9/2019): BP: 166/88  · Communication/Consultation:  None today  · Equipment provided today:  None today  · Recommendations/Intent for next treatment session: Next visit will focus on ROM all directions of R shoulder, passive, active assistive, and active. Progression of postural and UE strengthening as tolerated.     Total Treatment Billable Duration: 60 minutes  PT Patient Time In/Time Out  Time In: 1100  Time Out: 1700 [facility],

## 2019-09-27 ENCOUNTER — HOSPITAL ENCOUNTER (OUTPATIENT)
Dept: PHYSICAL THERAPY | Age: 69
Discharge: HOME | End: 2019-09-27
Payer: COMMERCIAL

## 2019-09-27 PROCEDURE — 97016 VASOPNEUMATIC DEVICE THERAPY: CPT

## 2019-09-27 PROCEDURE — 97140 MANUAL THERAPY 1/> REGIONS: CPT

## 2019-09-27 PROCEDURE — 97110 THERAPEUTIC EXERCISES: CPT

## 2019-09-27 NOTE — PROGRESS NOTES
Desi Hardin  : 1950  [place] [employer]  [place]: [place] [id number] at [place] [address], [place], [address] [place] [address] Street  [phone]   [phone] [id number]      OUTPATIENT PHYSICAL THERAPY: Daily Treatment Note 2019    ICD-10: Treatment Diagnosis: adhesive capsulitis of R shoulder (M75.01)                Treatment Diagnosis 2: pain in R shoulder (M25.511)                Treatment Diagnosis 3: stiffness in R shoulder (M25.611)  Precautions: Diabetes- with insulin pump, hypertension  Allergies: Milk containing products and Sulfa (sulfonamide antibiotics)   TREATMENT PLAN:  Effective Dates: 2019 TO 10/9/2019 (30 days). Frequency/Duration: 2 times a week for 30 Day(s) MEDICAL/REFERRING DIAGNOSIS:  Adhesive capsulitis of right shoulder [M75.01]   DATE OF ONSET: Patient underwent arthroscopic frozen shoulder release on 19. REFERRING PHYSICIAN: Mirna Arreola MD MD Orders: Evaluate and Treat   Return MD Appointment: 10/29/19     Pre-treatment Symptoms/Complaints:  Patient reported being awake all night long in pain from a manual procedure performed on him yesterday. \"Last night was the first night I have lost sleep from pain since my surgery. \"  \"Needless to say we wont be doing that anymore. \"    Pain: Initial: Pain Intensity 1: 2  Pain Location 1: Shoulder  Pain Orientation 1: Right  Post Session:  1/10 less pain increase shoulder motion   Medications Last Reviewed:  2019  Updated Objective Findings:  Pt. is still guarded and limited with external rotation in right shoulder. TREATMENT:   THERAPEUTIC EXERCISE: (30 minutes):  Exercises per grid below to improve mobility, strength, coordination and dynamic movement of shoulder - right to improve functional reaching and overhead activites.   Required moderate visual, verbal and manual cues to promote proper body alignment, promote proper body posture and promote proper body mechanics. Progressed resistance, range, repetitions and complexity of movement as indicated. Date:  9/18/19 Date:  9/26/19 Date:  9-27-19   Activity/Exercise Parameters Parameters Parameters   UBE --- 2 forward, 2 backward with good posture Level 4 x 3 mins fwd & 3 mins bkwd   Scapular retractions --- ---     Wand external rotation Supine, 15 x 10 seconds different pictures Supine x 10 reps x 10 sec hold  Supine x 10 & standing x 10 reps   Wand flexion Supine, 10 x 10 seconds Supine x 10 reps x 10 sec hold  Supine 2 x 10   Wand abduction Standing, 10 x 10 seconds X 20 reps x 10 sec hold standing  Standing 2 x 10   Pulley 5 minutes total, flexion, abduction, scaption X 8 mins flexion, scaption, & abduction 2 minutes flex  2 minutes scaption  2 minutes abduction   Towel stretch Adduction only with wand 10 x 10 seconds  Strap 15 x 10 seconds Prolonged stretches at end range x 3 mins with green strap Strap  5x30 sec hold    Finger ladder 10 x 10 seconds X 10 reps x 10 sec hold  X 10 reps x 10 sec hold at end range   Sleeper stretch Internal and external rotation 10 x 10 seconds each  IR & ER x 10 reps x 10 se hold    Thoracic extension stretch Blue bolster, seated, 10 x 10 seconds --- -----   Band row Red, 2 x 10 --- Red band x 10 reps hi & low each 5 sec hold    Band internal rotation Yellow x 10 --- Red band 2x10 reps 5 sec hold   Band external rotation Yellow x 10 --- Red band 2x10 reps 5 sec hold    Standing doorway unilateral external rotation stretch --- Instructed for home use and in the shower, 5 x 30 sec 5x30 sec hold at corner of wall     Time spent with patient reviewing proper muscle recruitment and technique with exercises.     MANUAL THERAPY: (15 minutes): Joint mobilization and Soft tissue mobilization was utilized and necessary because of the patient's restricted joint motion, loss of articular motion and restricted motion of soft tissue   Supine PROM to R shoulder all directions with end range holds to tolerance to maintain and improve ROM, emphasis on external rotation   Gentle oscillations for pain control   Supine contract relax into internal rotation from full external rotation to improve external rotation    MODALITIES: (15 minutes):      vasopneumatic in sitting with pillow under arm for pain and swelling     HEP: As above; handouts given to patient for all exercises. Treatment/Session Summary:    · Response to Treatment:  Pt. reported less pain and increased motion. .  · Baseline vitals (9/9/2019): BP: 166/88  · Communication/Consultation:  None today  · Equipment provided today:  None today  · Recommendations/Intent for next treatment session: Next visit will focus on ROM all directions of R shoulder, passive, active assistive, and active. Progression of postural and UE strengthening as tolerated.     Total Treatment Billable Duration: 60 minutes  PT Patient Time In/Time Out  Time In: 0800  Time Out: 0905  Bear Parker PTA

## 2019-10-01 ENCOUNTER — HOSPITAL ENCOUNTER (OUTPATIENT)
Dept: PHYSICAL THERAPY | Age: 69
Discharge: HOME | End: 2019-10-01
Payer: COMMERCIAL

## 2019-10-01 PROCEDURE — 97140 MANUAL THERAPY 1/> REGIONS: CPT

## 2019-10-01 PROCEDURE — 97110 THERAPEUTIC EXERCISES: CPT

## 2019-10-01 PROCEDURE — 97016 VASOPNEUMATIC DEVICE THERAPY: CPT

## 2019-10-01 NOTE — PROGRESS NOTES
Isis Rubio Scroggs  : 1950  [place] [employer] [place]  [place]: [place] [id number] at [place] [address], [facility], [address] [place] [address]  [phone]   [phone]      OUTPATIENT PHYSICAL THERAPY: Daily Treatment Note 10/1/2019    ICD-10: Treatment Diagnosis: adhesive capsulitis of R shoulder (M75.01)                Treatment Diagnosis 2: pain in R shoulder (M25.511)                Treatment Diagnosis 3: stiffness in R shoulder (M25.611)  Precautions: Diabetes- with insulin pump, hypertension  Allergies: Milk containing products and Sulfa (sulfonamide antibiotics)   TREATMENT PLAN:  Effective Dates: 2019 TO 10/9/2019 (30 days). Frequency/Duration: 2 times a week for 30 Day(s) MEDICAL/REFERRING DIAGNOSIS:  Adhesive capsulitis of right shoulder [M75.01]   DATE OF ONSET: Patient underwent arthroscopic frozen shoulder release on 19. REFERRING PHYSICIAN: Hugo Renteria MD MD Orders: Evaluate and Treat   Return MD Appointment: 10/29/19     Pre-treatment Symptoms/Complaints:  Patient reports sore continued soreness and pain since a few sessions ago. Reports feeling like he has some limitations with internal and external rotations. Pain: Initial: Pain Intensity 1: 3  Pain Location 1: Shoulder  Pain Orientation 1: Right  Post Session: 1/10   Medications Last Reviewed:  10/1/2019  Updated Objective Findings:  See Progress Note from today   TREATMENT:   THERAPEUTIC EXERCISE: (20 minutes):  Exercises per grid below to improve mobility, strength, coordination and dynamic movement of shoulder - right to improve functional reaching and overhead activites. Required moderate visual, verbal and manual cues to promote proper body alignment, promote proper body posture and promote proper body mechanics. Progressed resistance, range, repetitions and complexity of movement as indicated.      Date:  10/1/19 Date:  19 Date:  19 Activity/Exercise Parameters Parameters Parameters   UBE --- 2 forward, 2 backward with good posture Level 4 x 3 mins fwd & 3 mins bkwd   Scapular retractions --- ---     Wand external rotation Supine, 15 x 10 seconds different positions Supine x 10 reps x 10 sec hold  Supine x 10 & standing x 10 reps   Wand flexion reviewed Supine x 10 reps x 10 sec hold  Supine 2 x 10   Wand abduction reviwed X 20 reps x 10 sec hold standing  Standing 2 x 10   Pulley reviewed X 8 mins flexion, scaption, & abduction 2 minutes flex  2 minutes scaption  2 minutes abduction   Towel stretch Adduction only with wand 10 x 10 seconds  Strap 10 x 10 seconds Prolonged stretches at end range x 3 mins with green strap Strap  5x30 sec hold    Finger ladder --- X 10 reps x 10 sec hold  X 10 reps x 10 sec hold at end range   Sleeper stretch Internal and external rotation 10 x 10 seconds each  IR & ER x 10 reps x 10 se hold    Thoracic extension stretch --- --- ---   Band row reviewed --- Red band x 10 reps hi & low each 5 sec hold    Band internal rotation reviewed --- Red band 2x10 reps 5 sec hold   Band external rotation reviewed --- Red band 2x10 reps 5 sec hold    Standing doorway unilateral external rotation stretch 10 x 15 seconds Instructed for home use and in the shower, 5 x 30 sec 5x30 sec hold at corner of wall     Time spent with patient reviewing proper muscle recruitment and technique with exercises.     MANUAL THERAPY: (30 minutes): Joint mobilization and Soft tissue mobilization was utilized and necessary because of the patient's restricted joint motion, loss of articular motion and restricted motion of soft tissue   Supine PROM to R shoulder all directions with end range holds to tolerance to maintain and improve ROM, emphasis on external and internal rotations   Gentle oscillations for pain control   Supine contract relax into internal rotation from full external rotation to improve external rotation    MODALITIES: (15 minutes):      vasopneumatic in sitting with pillow under arm for pain and swelling     HEP: As above; handouts given to patient for all exercises. Treatment/Session Summary:    · Response to Treatment:  Some increased ROM by end of session. Less pain and sorenss reported. Patient expressed understanding of updated HEP. On hold while out of town and advised to call if needing additional appointments. .  · Baseline vitals (9/9/2019): BP: 166/88  · Communication/Consultation:  None today  · Equipment provided today:  None today  · Recommendations/Intent for next treatment session: Patient on hold for 2 weeks (until 10/15/19)    Total Treatment Billable Duration: 65 minutes  PT Patient Time In/Time Out  Time In: 0700  Time Out: 0810  Jules Joshi, PT

## 2019-10-01 NOTE — PROGRESS NOTES
Gray Reevesnatali  : 1950  [place] [employer] [place]  [place]: [place] [id number] at [place] [address], Jeffery castillo, [address]  [phone]   [phone]       OUTPATIENT PHYSICAL THERAPY:Progress Report and 2 Week Hold 10/1/2019    ICD-10: Treatment Diagnosis: adhesive capsulitis of R shoulder (M75.01)                Treatment Diagnosis 2: pain in R shoulder (M25.511)                Treatment Diagnosis 3: stiffness in R shoulder (M25.611)  Precautions: Diabetes- with insulin pump, hypertension  Allergies: Milk containing products and Sulfa (sulfonamide antibiotics)   TREATMENT PLAN:  Effective Dates: 2019 TO 10/9/2019 (30 days). Frequency/Duration: 2 times a week for 30 Day(s) MEDICAL/REFERRING DIAGNOSIS:  Adhesive capsulitis of right shoulder [M75.01]   DATE OF ONSET: Patient underwent arthroscopic frozen shoulder release on 19. REFERRING PHYSICIAN: Carolyn Herrera MD MD Orders: Evaluate and Treat   Return MD Appointment: 10/29/19     INITIAL ASSESSMENT:  Mr. Kierra Lawton is a 81 y.o. male presenting to physical therapy with complaints of R shoulder pain and stiffness s/p arthroscopic frozen shoulder release on 19. Patient was seen in our clinic in 2019 and did well with conservative treatment. He reports continuing with his exercises at home after being done with that plan of care, but getting busy with traveling and taking care of family members and his shoulder tightened up again. Patient states the decided surgery was his best option and he is feeling better since Friday. Spoke at length with patient and he is aware of the importance of HEP, continue with stretching to keep and gain ROM, and overall progression of therapy.  Patient presents with increased pain, decreased strength, decreased ROM, decreased flexibility, impaired posture, impaired overhead reach, impaired transfer ability, decreased activity tolerance, and overall impaired functional mobility. Patient is a good candidate for skilled physical therapy interventions to include manual therapy, therapeutic exercise, transfer training, postural re-education, body mechanics training, and pain modalities as needed. PROGRESS NOTE / 2 WEEK HOLD 10/1/19: Patient has been seen for 7 sessions of physical therapy from 9/9/19 to 10/1/19. He reports feeling about 70% better since starting therapy until with improved ROM, but feels some limited with rotations over the last week or so. He reports an average pain/ soreness rated 2/10 during the day, but sometimes none at all. Patient continues to perform his exercises and stretches at home. He would like to be on hold while out of town and continue with his progression at home. Advised patient to call with any questions/ concerns of if he feels he needs to return for additional therapy sessions. PROBLEM LIST (Impacting functional limitations):  1. Decreased Strength  2. Decreased ADL/Functional Activities  3. Decreased Transfer Abilities  4. Increased Pain  5. Decreased Activity Tolerance  6. Decreased Pacing Skills  7. Decreased Work Simplification/Energy Conservation Techniques  8. Decreased Flexibility/Joint Mobility INTERVENTIONS PLANNED: (Treatment may consist of any combination of the following)  1. Bed Mobility  2. Cold  3. Cryotherapy  4. Electrical Stimulation  5. Family Education  6. Heat  7. Home Exercise Program (HEP)  8. Manual Therapy  9. Neuromuscular Re-education/Strengthening  10. Range of Motion (ROM)  11. Therapeutic Activites  12. Therapeutic Exercise/Strengthening  13. Transfer Training     GOALS: (Goals have been discussed and agreed upon with patient.)  Short-Term Functional Goals: Time Frame: 9/9/2019 to 9/26/19  1. Patient demonstrates independence with home exercise program without verbal cueing provided by therapist. -GOAL MET  2.  Patient will report no more than 2/10 R shoulder pain at rest in order to demonstrate improved self pain control and tolerance. -GOAL MET  3. Patient will demonstrate improved upright posture with decreased scapular protraction and rounded shoulders in order to improve clearance for overhead activities. -GOAL MET  4. Patient will improve AAROM R shoulder flexion to 150 degrees in order to demonstrate improved functional ROM. -GOAL MET  Discharge Goals: Time Frame: 9/9/2019 to 10/9/19  1. Patient will improve R shoulder external rotation ROM to at least 45 degrees in order to demonstrate symmetrical ROM compared to L UE. -ONGOING  2. Patient will improve gross R UE strength to at least 4+/5 in order to improve functional use and activities. -GOAL MET  3. Patient will improve R shoulder functional internal and external ROM to L1 and C7, respectively, in order to improve ability to don/ doff belt and wash hair. -ONGOING  4. Patient will be able to dress/ bathe with minimal to no difficulty or modifications in order to return to prior level of function. -GOAL MET  5. Patient will demonstrate R shoulder active ROM of at least 140 degrees in order to perform functional overhead activities. -GOAL MET  6. Patient will improve Disability of the Arm, Shoulder, and Hand score to 20/55 from 27/55. -ONGOING    Outcome Measure: Tool Used: Disabilities of the Arm, Shoulder and Hand (DASH) Questionnaire - Quick Version  Score:  Initial: 27/55  Most Recent: 22/55 (Date: 10/1/19 )   Interpretation of Score: The DASH is designed to measure the activities of daily living in person's with upper extremity dysfunction or pain. Each section is scored on a 1-5 scale, 5 representing the greatest disability. The scores of each section are added together for a total score of 55. UPDATED OBJECTIVE FINDINGS:   Palpation:          Minimal to no (from moderate) tenderness to palpation over gross R shoulder and incision sites.   ROM:    AROM/ PROM Left (degrees) Right (degrees)   Shoulder Flexion 160 152 (from 120)   Shoulder Abduction 150 120 (from 85)   Shoulder Internal Rotation  70 70 (from 50)   Functional Internal Rotation T10 L5   (from R SI joint)   Shoulder External Rotation 45 20 at 45 abduction  40 at 90 abduction   (from 15)   Functional External Rotation T2 T2   (from back of head)     Strength: Motion Tested Left   (*/5) Right  (*/5)   Shoulder Flexion 5 4+   Scaption 5 4+   Shoulder Abduction 5 4+ (from 4)   Shoulder Internal Rotation  5 5 (from 4+)   Shoulder External Rotation 5 4+ (from 4)   Elbow Flexion 5 5   Elbow Extension 5 5     Functional Mobility:         Patient requires minimal to no modifications for dressing and bathing. Improved overhead reach of R UE. Improving R UE strength. Medical Necessity:   · Patient is expected to demonstrate progress in strength, range of motion, coordination and functional technique to increase independence with dressing and bathing and improve safety during reaching and overhead activities. · Skilled intervention continues to be required due to decreased functional ROM and independence. Reason for Services/Other Comments:  · Patient continues to require skilled intervention due to decreased functional ROM and strength hindering return to prior level of activities and independence. Rehabilitation Potential For Stated Goals: Good  Regarding Aga Ram therapy, I certify that the treatment plan above will be carried out by a therapist or under their direction.   Thank you for this referral,  Pascale Shannon, PT , DPT, OMT-C

## 2019-10-03 ENCOUNTER — APPOINTMENT (OUTPATIENT)
Dept: PHYSICAL THERAPY | Age: 69
End: 2019-10-03
Payer: COMMERCIAL

## 2019-10-24 NOTE — THERAPY DISCHARGE
Lashae Hardin  : 1950  [place] [employer] [place]  [place] [id number] at [address]  [phone]   [phone]       OUTPATIENT PHYSICAL THERAPY:Discontinuation Summary 10/24/2019    ICD-10: Treatment Diagnosis: adhesive capsulitis of R shoulder (M75.01)                Treatment Diagnosis 2: pain in R shoulder (M25.511)                Treatment Diagnosis 3: stiffness in R shoulder (M25.611)  Precautions: Diabetes- with insulin pump, hypertension  Allergies: Milk containing products and Sulfa (sulfonamide antibiotics)   TREATMENT PLAN:  Effective Dates: 2019 TO 10/9/2019 (30 days). Frequency/Duration: 2 times a week for 30 Day(s) MEDICAL/REFERRING DIAGNOSIS:  Adhesive capsulitis of right shoulder [M75.01]   DATE OF ONSET: Patient underwent arthroscopic frozen shoulder release on 19. REFERRING PHYSICIAN: Vince Hopson MD MD Orders: Evaluate and Treat   Return MD Appointment: 10/29/19     INITIAL ASSESSMENT:  Mr. Deloris Chavira is a 81 y.o. male presenting to physical therapy with complaints of R shoulder pain and stiffness s/p arthroscopic frozen shoulder release on 19. Patient was seen in our clinic in 2019 and did well with conservative treatment. He reports continuing with his exercises at home after being done with that plan of care, but getting busy with traveling and taking care of family members and his shoulder tightened up again. Patient states the decided surgery was his best option and he is feeling better since Friday. Spoke at length with patient and he is aware of the importance of HEP, continue with stretching to keep and gain ROM, and overall progression of therapy.  Patient presents with increased pain, decreased strength, decreased ROM, decreased flexibility, impaired posture, impaired overhead reach, impaired transfer ability, decreased activity tolerance, and overall impaired functional mobility. Patient is a good candidate for skilled physical therapy interventions to include manual therapy, therapeutic exercise, transfer training, postural re-education, body mechanics training, and pain modalities as needed. DISCONTINUATION 10/24/19: Patient was seen for 7 sessions of physical therapy from 9/9/19 to 10/1/19. He reported feeling about 70% better since starting therapy with improved ROM, but felt some limited with rotations. He reported an average pain/ soreness rated 2/10 during the day, but sometimes none at all. Patient continued to perform his exercises and stretches at home. He wished to be on hold while out of town and to continue with his progression at home. Advised patient to call with any questions/ concerns of if he felt he needed to return for additional therapy sessions. Patient did not call for additional sessions and is formally discharged at this time. PROBLEM LIST (Impacting functional limitations):  1. Decreased Strength  2. Decreased ADL/Functional Activities  3. Decreased Transfer Abilities  4. Increased Pain  5. Decreased Activity Tolerance  6. Decreased Pacing Skills  7. Decreased Work Simplification/Energy Conservation Techniques  8. Decreased Flexibility/Joint Mobility INTERVENTIONS PLANNED: (Treatment may consist of any combination of the following)  1. Bed Mobility  2. Cold  3. Cryotherapy  4. Electrical Stimulation  5. Family Education  6. Heat  7. Home Exercise Program (HEP)  8. Manual Therapy  9. Neuromuscular Re-education/Strengthening  10. Range of Motion (ROM)  11. Therapeutic Activites  12. Therapeutic Exercise/Strengthening  13. Transfer Training     GOALS: (Goals have been discussed and agreed upon with patient.)  Short-Term Functional Goals: Time Frame: 9/9/2019 to 9/26/19  1. Patient demonstrates independence with home exercise program without verbal cueing provided by therapist. -GOAL MET  2.  Patient will report no more than 2/10 R shoulder pain at rest in order to demonstrate improved self pain control and tolerance. -GOAL MET  3. Patient will demonstrate improved upright posture with decreased scapular protraction and rounded shoulders in order to improve clearance for overhead activities. -GOAL MET  4. Patient will improve AAROM R shoulder flexion to 150 degrees in order to demonstrate improved functional ROM. -GOAL MET  Discharge Goals: Time Frame: 9/9/2019 to 10/9/19  1. Patient will improve R shoulder external rotation ROM to at least 45 degrees in order to demonstrate symmetrical ROM compared to L UE. -NOT MET  2. Patient will improve gross R UE strength to at least 4+/5 in order to improve functional use and activities. -GOAL MET  3. Patient will improve R shoulder functional internal and external ROM to L1 and C7, respectively, in order to improve ability to don/ doff belt and wash hair. -NOT MET  4. Patient will be able to dress/ bathe with minimal to no difficulty or modifications in order to return to prior level of function. -GOAL MET  5. Patient will demonstrate R shoulder active ROM of at least 140 degrees in order to perform functional overhead activities. -GOAL MET  6. Patient will improve Disability of the Arm, Shoulder, and Hand score to 20/55 from 27/55. -NOT MET    Outcome Measure: Tool Used: Disabilities of the Arm, Shoulder and Hand (DASH) Questionnaire - Quick Version  Score:  Initial: 27/55  Most Recent: 22/55 (Date: 10/1/19 )   Interpretation of Score: The DASH is designed to measure the activities of daily living in person's with upper extremity dysfunction or pain. Each section is scored on a 1-5 scale, 5 representing the greatest disability. The scores of each section are added together for a total score of 55. UPDATED OBJECTIVE FINDINGS: (from 10/1/19)  Palpation:          Minimal to no (from moderate) tenderness to palpation over gross R shoulder and incision sites.   ROM:    AROM/ PROM Left (degrees) Right (degrees)   Shoulder Flexion 160 152 (from 120)   Shoulder Abduction 150 120 (from 85)   Shoulder Internal Rotation  70 70 (from 50)   Functional Internal Rotation T10 L5   (from R SI joint)   Shoulder External Rotation 45 20 at 45 abduction  40 at 90 abduction   (from 15)   Functional External Rotation T2 T2   (from back of head)     Strength: Motion Tested Left   (*/5) Right  (*/5)   Shoulder Flexion 5 4+   Scaption 5 4+   Shoulder Abduction 5 4+ (from 4)   Shoulder Internal Rotation  5 5 (from 4+)   Shoulder External Rotation 5 4+ (from 4)   Elbow Flexion 5 5   Elbow Extension 5 5     Functional Mobility:         Patient requires minimal to no modifications for dressing and bathing. Improved overhead reach of R UE. Improving R UE strength. Reason for Services/Other Comments:  · patient discharged at this time. Rehabilitation Potential For Stated Goals: Good  Regarding Aga Ram therapy, I certify that the treatment plan above will be carried out by a therapist or under their direction.   Thank you for this referral,  Tiffanie Jarquin, PT , DPT, OMT-C

## 2022-11-15 ENCOUNTER — OFFICE VISIT (OUTPATIENT)
Dept: ORTHOPEDIC SURGERY | Age: 72
End: 2022-11-15

## 2022-11-15 VITALS — HEIGHT: 73 IN | WEIGHT: 215 LBS | BODY MASS INDEX: 28.49 KG/M2

## 2022-11-15 DIAGNOSIS — M25.561 RIGHT KNEE PAIN, UNSPECIFIED CHRONICITY: Primary | ICD-10-CM

## 2022-11-15 RX ORDER — ROSUVASTATIN CALCIUM 5 MG/1
TABLET, COATED ORAL
COMMUNITY

## 2022-11-15 RX ORDER — INSULIN GLARGINE 100 [IU]/ML
INJECTION, SOLUTION SUBCUTANEOUS
COMMUNITY
Start: 2020-05-18

## 2022-11-15 RX ORDER — AMLODIPINE BESYLATE 2.5 MG/1
TABLET ORAL
COMMUNITY

## 2022-11-15 RX ORDER — ASPIRIN 81 MG/1
81 TABLET, CHEWABLE ORAL DAILY
COMMUNITY

## 2022-11-15 RX ORDER — LEVOFLOXACIN 750 MG/1
TABLET ORAL
COMMUNITY
Start: 2022-11-07

## 2022-11-15 RX ORDER — IMIPRAMINE HYDROCHLORIDE 25 MG/1
TABLET ORAL
COMMUNITY
Start: 2022-11-08

## 2022-11-15 RX ORDER — ALBUTEROL SULFATE 90 UG/1
POWDER, METERED RESPIRATORY (INHALATION)
COMMUNITY
Start: 2022-11-07

## 2022-11-15 RX ORDER — INSULIN LISPRO 100 [IU]/ML
INJECTION, SOLUTION INTRAVENOUS; SUBCUTANEOUS
COMMUNITY
Start: 2022-10-28

## 2022-11-15 RX ORDER — TADALAFIL 20 MG/1
20 TABLET ORAL PRN
COMMUNITY
Start: 2015-05-29

## 2022-11-15 NOTE — PROGRESS NOTES
Patient was fitted and instructed on a Genutrain Titan 5 for the right knee. Patient read and signed documenting they understand and agree to [facility]'s current DME return policy.

## 2022-11-15 NOTE — PROGRESS NOTES
Name: Adali Acosta  YOB: 1950  Gender: male  MRN: [id number]     CC: Right knee injury    HPI:   Early September 2022: He was mowing his lawn, twisted his right knee and noticed superior lateral knee pain;  11/15/2022: He presents to discuss his right knee. Superior lateral right knee swelling/prominence > pain. ROS/Meds/PSH/PMH/FH/SH: reviewed today    Tobacco:  reports that he has never smoked. He has never used smokeless tobacco.   Insulin-dependent diabetic on insulin pump    Physical Examination:  Patient appears to be alert and oriented with acceptable appearance. No obvious distress or SOB  CV: appears to have acceptable vascular color and capillary refill  Neuro: appears to have mostly intact light touch sensation   Skin: No knee effusion; vastus lateralis/iliotibial band/lateral patellar retinaculum area soft tissue thickening  MS: Standing: Plantigrade: Gait good  Right = superior lateral peripatellar region pain in the area of the vastus lateralis/iliotibial band/lateral patellar retinaculum  Right = no loss of motion or motor; 5/5 strength; no instability or crepitance    XR: Right knee: Standing AP lateral notch and sunrise views with mild tricompartmental knee arthritis; high riding patella; no acute pathology noted; patellofemoral changes  XR Impression:  As above      Injection: Hold today: Insulin-dependent diabetic    Assessment:    Right knee injury with suspected partial vastus lateralis quadriceps tear vs.partial tear lateral patellar retinaculum    Plan:   The patient and I discussed the above assessment. We explored treatment options.      Due to his persistent symptoms, I believe MRI scan is indicated in order to direct future treatment  Advanced medical imaging: Right knee MRI scan: Please assess right knee injury for suspected partial vastus lateralis quadriceps partial tear versus partial tear lateral patellar retinaculum    DME: Right knee Genutrain brace  We discussed knee care and brace protection  PT: PT will depend on MRI scan  Orthotic/prosthetic: No indication for custom insoles       Medication - OTC meds prn: Prescribed: Boswellia, Devil's claw, Turmeric-curcumin   Magne sports topical rub with frankincense and myrrh      Surgical discussion: Any surgery will depend on MRI scan  Follow up: After MRI scan  Work status: Retired    This note was created using Dragon voice recognition software which may result in errors of speech and spelling recognition and word/phrase syntax errors.

## 2022-11-15 NOTE — LETTER
Carma  Arthritis oral choices: Individual Turmeric-Curcumin; Neyda Fortine; Boswellia                           -or-   Combination [employer] Turmeric strength for joints that includes all 3 listed above     Magne topical Sports:   Balm or liquid Spray with frankincense/myrrh      Nerve medication options:   CBD, Alpha Lipoic acid, Lion's allison and any other recommended neuropathic medication            Immune/healing possibilities:  Echinacea, Elderberry    As Needed: Dead sea bath salts, Essential Oils, scar cream, Gout and cramping medications    The above list of recommended medications is only a starting point. Please allow the experts at [facility] to make the final recommendations. Before using any of these medications, please make sure that you have no concerns, senstivities or allergies to the listed ingredients in each bottle/container. Also, please check with your pharmacist or your primary care physician regarding any and all possible interactions with your other daily medications. "[employer] Locations:   [address] [place] [address]            Sincerely,      Anshul Gonzalez MD

## 2022-11-15 NOTE — LETTER
DME Patient Authorization Form    Name: Cleo Cordero  : 1950  MRN: [id number]   Age: 81 y.o. Gender: male  Delivery Address: [facility]     Diagnosis:     ICD-10-CM    1. Right knee pain, unspecified chronicity  M25.561 XR KNEE RIGHT (MIN 4 VIEWS)           Requested DME:  [employer] Knee MXYEO**-45 ($60.00) X 1 - right        Clinical Notes:     **Indicates non-covered items by insurance. Payment expected on date of service. Electronically signed by  Provider: Harrel Schwab, MD__Date: 11/15/2022                            [address] Tax ID # [id number]        Durable Medical Equipment and/or Orthotics Patient Consent     I understand that my physician has prescribed this medical supply as part of my treatment plan as a matter of Medical Necessity.  I understand that I have a choice in where I receive my prescribed orthopedic supplies and/or services.  I authorize [facility] to furnish this service/product and to provide my insurance carrier with any information requested in order to process for payment.  I instruct my insurance carrier to pay [facility] directly for these services/products.  I understand that my insurance carrier may deny payment for this supply because it is a non-covered item, deemed not medically necessary or considered experimental.   I understand that any cost not covered by my insurance carrier will be solely my financial responsibility.  I have received the Supplier Standards and have reviewed them.  I have received the prescribed item and have been fully instructed on the proper use of the above services/products.    ______ (Patient Initials) I understand that all DME items are non-returnable after being dispensed. Items still in sealed packaging may be returned up to 14 days after purchasing.  [facility] will replace items that are defective.    ______ (Patient Initials) I understand that [facility] will not file a claim with my insurance carrier for this service/product and I am waiving my right to file a claim on my own for this service/product with my insurance company as this item is NON-COVERED (Denoted by the **) by my Insurance company/policy. ______ (Patient Initials) I understand that I am responsible to bring my equipment to the hospital for any surgery. ______________________________________________  ________________________  Patient / Sadaf Mosley            Thank you for considering [address] Your physician has prescribed specific medical equipment or devices for your home use. The following describes your rights and responsibilities as our customer. Right to Choose Providers: You have a choice regarding which company supplies your home medical equipment and devices, and to consult your physician in this decision. You may choose a medical supply store, a home medical equipment provider, or a specialist such as [facility]. [facility] will coordinate with your physician to provide the medical equipment or devices prescribed for your home use. Right to Service:  You have the right to considerate, respectful and nondiscriminatory care. You have the right to receive accurate and easily understood information about your health care. If you speak a foreign language, or don't understand the discussions, assistance will be provided to allow you to make informed health care decisions. You have the right to know your treatment options and to participate in decisions about your care, including the right to accept or refuse treatment. You have the right to expect a reasonable response to your requests for treatment or service.   You have the right to talk in confidence with health care providers and to have your health care information protected. You have the right to receive an explanation of your bill. You have the right to complain about the service or product you receive. Patient Responsibilities:  Please provide complete and accurate information about your health insurance benefits and make arrangements for the timely payment of your bill. [facility] will, if possible, assume responsibility for billing your insurance (Medicare, Medicaid and commercial) for the prescribed equipment or devices. If your policy does not cover the prescribed product, or only covers a portion of the bill, you are responsible for any remaining balance. Return and Exchange Policy:  [facility] will honor published  Warranties for products. [facility] will accept returns or exchanges within 14 days from the date of receipt, providin) the product must be in new condition; 2) receipt as required; and 3) used disposable and hygiene products may only be returned due to a defective product. Note: Refunds will be issued in a timely manner, please allow 4-6 weeks for processing. Complaint Procedures and DME Consumer Protection Resources:  [facility] values you as a customer, and is committed to resolving patient concerns. This commitment includes understanding and documenting your concerns, conducting a review of internal procedures, and providing you with an explanation and resolution to your concerns. Should you have any questions about our services or billing process, please contact our office at (practice phone number). If we are unable to resolve the concern, you have the right to direct comments to the office of Consumer Protection, in the [address] or the [facility] office, without fear of repercussion.     DMEPOS SUPPLIER STANDARDS    A supplier must be in compliance with all applicable Federal and State licensure and regulatory requirements. A supplier must provide complete and accurate information on the DMEPOS supplier application. Any changes to this information must be reported to the [facility] within 30 days. An authorized individual (one whose signature is binding) must sign the application for billing privileges. A supplier must fill orders from its own inventory, or must contract with other companies for the purchase of items necessary to fill the order. A supplier may not contract with any entity that is currently excluded from the Medicare program, any [facility] program, or from any other Federal procurement or Nonprocurement programs. A supplier must advise beneficiaries that they may rent or purchase inexpensive or routinely purchased durable medical equipment, and of the purchase option for capped rental equipment. A supplier must notify beneficiaries of warranty coverage and honor all warranties under applicable State Law, and repair or replace free of charge Medicare covered items that are under warranty. A supplier must maintain a physical facility on an appropriate site. A supplier must permit CMS, or its agents to conduct on-site inspections to ascertain the supplier's compliance with these standards. The supplier location must be accessible to beneficiaries during reasonable business hours, and must maintain a visible sign and posted hours of operation. A supplier must maintain a primary business telephone listed under the name of the business in a [employer] or a toll free number available through directory assistance. The exclusive use of a beeper, answering machine or cell phone is prohibited. A supplier must have comprehensive liability insurance in the amount of at least $300,000 that covers both the supplier's place of business and all customers and employees of the supplier.   If the supplier manufactures its own items, this insurance must also cover product liability and completed operations. A supplier must agree not to initiate telephone contact with beneficiaries, with a few exceptions allowed. This standard prohibits suppliers from calling beneficiaries in order to solicit new business. A supplier is responsible for delivery and must instruct beneficiaries on use of Medicare covered items, and maintain proof of delivery. A supplier must answer questions, and respond to complaints of the beneficiaries, and maintain documentation of such contacts. A supplier must maintain and replace at no charge or repair directly, or through a service contract with another company, Medicare covered items it has rented to beneficiaries. A supplier must accept returns of substandard (less than full quality for the particular item) or unsuitable items (inappropriate for the beneficiary at the time it was fitted and rented or sold) from beneficiaries. A supplier must disclose these supplier standards to each beneficiary to whom it supplies a Medicare-covered item. A supplier must disclose to the government any person having ownership, financial, or control interest in the supplier. A supplier must not convey or reassign a supplier number; i.e., the supplier may not sell or allow another entity to use its Medicare billing number. A supplier must have a complaint resolution protocol established to address beneficiary complaints that relate to these standards. A record of these complaints must be maintained at the physical facility. Complaint records must include: the name, address, telephone number and health insurance claim number of the beneficiary, a summary of the complaint, and any action taken to resolve it. A supplier must agree to furnish CMS any information required by the Medicare statute and implementing regulations.   A supplier of DMEPOS and other items and services must be accredited by a CMS-approved accreditation organization in order to receive and retain a supplier billing number. The accreditation must indicate the specific products and services, for which the supplier is accredited in order for the supplier to receive payment for those specific products and services. A DMEPOS supplier must notify their accreditation organization when a new DMEPOS location is opened. The accreditation organization may accredit the new supplier location for three months after it is operational without requiring a new site visit. All DMEPOS supplier locations, whether owned or subcontracted, must meet the [employer] and be separately accredited in order to bill Medicare. An accredited supplier may be denied enrollment or their enrollment may be revoked, if CMS determines that they are not in compliance with the DMEPOS quality standards. A DMEPOS supplier must disclose upon enrollment all products and services, including the addition of new product lines for which they are seeking accreditation. If a new product line is added after enrollment, the DMEPOS supplier will be responsible for notifying the accrediting body of the new product so that the DMEPOS supplier can be re-surveyed and accredited for these new products. Must meet the surety bond requirements specified in 42 C. F.R. 424.57(c). Implementation date- May 4, 2009. A supplier must obtain oxygen from a state-licensed oxygen supplier. A supplier must maintain ordering and referring documentation consistent with provisions found in 42 C. F.R. 424.516(f). DMEPOS suppliers are prohibited from sharing a practice location with certain other Medicare providers and suppliers. DMEPOS suppliers must remain open to the public for a minimum of 30 hours per week with certain exceptions.

## 2022-11-22 ENCOUNTER — OFFICE VISIT (OUTPATIENT)
Dept: ORTHOPEDIC SURGERY | Age: 72
End: 2022-11-22
Payer: MEDICARE

## 2022-11-22 VITALS — BODY MASS INDEX: 28.49 KG/M2 | WEIGHT: 215 LBS | HEIGHT: 73 IN

## 2022-11-22 DIAGNOSIS — M25.561 RIGHT KNEE PAIN, UNSPECIFIED CHRONICITY: Primary | ICD-10-CM

## 2022-11-22 PROCEDURE — 99213 OFFICE O/P EST LOW 20 MIN: CPT | Performed by: ORTHOPAEDIC SURGERY

## 2022-11-22 PROCEDURE — 1036F TOBACCO NON-USER: CPT | Performed by: ORTHOPAEDIC SURGERY

## 2022-11-22 PROCEDURE — 1123F ACP DISCUSS/DSCN MKR DOCD: CPT | Performed by: ORTHOPAEDIC SURGERY

## 2022-11-22 PROCEDURE — 3017F COLORECTAL CA SCREEN DOC REV: CPT | Performed by: ORTHOPAEDIC SURGERY

## 2022-11-22 PROCEDURE — G8427 DOCREV CUR MEDS BY ELIG CLIN: HCPCS | Performed by: ORTHOPAEDIC SURGERY

## 2022-11-22 PROCEDURE — G8484 FLU IMMUNIZE NO ADMIN: HCPCS | Performed by: ORTHOPAEDIC SURGERY

## 2022-11-22 PROCEDURE — G8417 CALC BMI ABV UP PARAM F/U: HCPCS | Performed by: ORTHOPAEDIC SURGERY

## 2022-11-22 NOTE — PROGRESS NOTES
Name: Lupe De Santiago  YOB: 1950  Gender: male  MRN: [id number]     11/15/2022: Initial visit me for right knee injury  11/22/2022: He returns to discuss MRI scan. He reports with the brace and natural medication resolved swelling and pain and he is back to full activity    HPI:   Early September 2022: He was mowing his lawn, twisted his right knee and noticed superior lateral knee pain;  11/15/2022: He presented to discuss his right knee. Superior lateral right knee swelling/prominence > pain. ROS/Meds/PSH/PMH/FH/SH: reviewed today    Tobacco:  reports that he has never smoked. He has never used smokeless tobacco.   Insulin-dependent diabetic on insulin pump    Physical Examination:  Patient appears to be alert and oriented with acceptable appearance. No obvious distress or SOB  CV: appears to have acceptable vascular color and capillary refill  Neuro: appears to have mostly intact light touch sensation   Skin: No knee effusion; no swelling  MS: Standing: Plantigrade: Gait is full  Right = resolved knee pain; full knee motion; 5/5 strength; no instability or crepitance    XR: Right knee: Standing AP lateral notch and sunrise views 11/15/2022 with mild tricompartmental knee arthritis; high riding patella; no acute pathology noted; patellofemoral changes  XR Impression:  As above      11/18/2022: Right knee MRI scan: Radiologic impression:  1. Findings likely representing low-grade sprain of the lateral patellofemoral retinaculum  2. Mild edema seen within the distal vastus medialis muscle with little of the vastus lateralis muscle seen secondary to suprapatellar joint effusion possibly low-grade muscle strain. 3.  Probable additional low-grade muscle strain of the gastrocnemius  4.   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 We explored treatment options. His MRI scan confirms lateral patellofemoral retinacular injury as well as mainly vastus lateralis injury   MRI scan also reveals mild strain vastus medialis, gastrocnemius and full-thickness lateral patellar facet articular cartilage loss  He is doing really well. Between the natural medication and the Gen[employer]rain brace. He is back to full activity with no swelling and no pain    We discussed knee care and Genutrain brace protection  PT: I discussed formal PT but he is doing really well on his own  Orthotic/prosthetic: No indication for custom insoles       Medication - OTC meds prn: He will continue prior prescribed: Boswellia, Devil's claw, Turmeric-curcumin   Magne sports topical rub with frankincense and myrrh      Surgical discussion: Based on MRI scan, no acute indication for arthroscopic surgery  Follow up: As needed  Work status: Retired    This note was created using Dragon voice recognition software which may result in errors of speech and spelling recognition and word/phrase syntax errors.

## 2023-03-21 ENCOUNTER — OFFICE VISIT (OUTPATIENT)
Dept: SURGERY | Age: 73
End: 2023-03-21
Payer: MEDICARE

## 2023-03-21 VITALS
DIASTOLIC BLOOD PRESSURE: 68 MMHG | HEART RATE: 75 BPM | BODY MASS INDEX: 28.89 KG/M2 | WEIGHT: 218 LBS | SYSTOLIC BLOOD PRESSURE: 140 MMHG | HEIGHT: 73 IN | OXYGEN SATURATION: 98 %

## 2023-03-21 DIAGNOSIS — K80.20 CALCULUS OF GALLBLADDER WITHOUT CHOLECYSTITIS WITHOUT OBSTRUCTION: Primary | ICD-10-CM

## 2023-03-21 PROCEDURE — 1036F TOBACCO NON-USER: CPT | Performed by: SURGERY

## 2023-03-21 PROCEDURE — 1123F ACP DISCUSS/DSCN MKR DOCD: CPT | Performed by: SURGERY

## 2023-03-21 PROCEDURE — G8427 DOCREV CUR MEDS BY ELIG CLIN: HCPCS | Performed by: SURGERY

## 2023-03-21 PROCEDURE — G8484 FLU IMMUNIZE NO ADMIN: HCPCS | Performed by: SURGERY

## 2023-03-21 PROCEDURE — 99203 OFFICE O/P NEW LOW 30 MIN: CPT | Performed by: SURGERY

## 2023-03-21 PROCEDURE — 3017F COLORECTAL CA SCREEN DOC REV: CPT | Performed by: SURGERY

## 2023-03-21 PROCEDURE — G8417 CALC BMI ABV UP PARAM F/U: HCPCS | Performed by: SURGERY

## 2023-03-21 NOTE — PROGRESS NOTES
daily only in case of pump failure. Keep rx on file. cyanocobalamin 1000 MCG tablet Take 1,000 mcg by mouth daily      lisinopril (PRINIVIL;ZESTRIL) 40 MG tablet Take 40 mg by mouth      simvastatin (ZOCOR) 40 MG tablet Take 40 mg by mouth       No current facility-administered medications for this visit. Allergies   Allergen Reactions    Sulfa Antibiotics Rash     Past Surgical History:   Procedure Laterality Date    COLONOSCOPY      ORTHOPEDIC SURGERY      Left Shoulder manipulation      Family History   Problem Relation Age of Onset    Heart Failure Mother     Heart Disease Father         heart attack at 36     Hypertension Mother     Cancer Father         prostate      Social History     Tobacco Use    Smoking status: Never    Smokeless tobacco: Never   Substance Use Topics    Alcohol use: Not Currently        Review of Systems   A comprehensive review of systems was negative except for that written in the HPI.      Physical Exam  BP (!) 140/68   Pulse 75   Ht 6' 1\" (1.854 m)   Wt 218 lb (98.9 kg)   SpO2 98%   BMI 28.76 kg/m²      General: Alert, oriented, cooperative, awake patient in no acute distress   Skin:  Warm, moist with good texture   Eyes:   Sclera are clear, extraocular muscles intact  HENT:  Normocephalic; oral mucosa moist, nares patent; neck is supple; trachea midline  Respiratory: Lungs clear to auscultation bilaterally, breathing is non-labored   Chest:  Symmetric throughout one respiratory excursion; no supraclavicular lymphadenopathy  CV:  Regular rate and rhythm, no appreciable murmurs, rubs, gallops  Abdomen: Soft, protuberant but non-distended; bowel sounds are normoactive   Extremities: No cyanosis, clubbing or edema  Neurological: No focal signs      Labs:     CBC (With Auto Differential) Reflex to Manual Diff if Indicated  Specimen:  Blood - Blood specimen (specimen)   Ref Range & Units 2 wk ago Comments   WBC 3.50 - 10.80 K/microL 4.51     RBC M/microL 4.42     HGB 13.0 -

## (undated) DEVICE — SET IRRIG W 96IN TBNG 4 LN FLX BG

## (undated) DEVICE — INTENDED FOR TISSUE SEPARATION, AND OTHER PROCEDURES THAT REQUIRE A SHARP SURGICAL BLADE TO PUNCTURE OR CUT.: Brand: BARD-PARKER SAFETY BLADES SIZE 15, STERILE

## (undated) DEVICE — SOLUTION IRRIG 3000ML 0.9% SOD CHL FLX CONT 0797208] ICU MEDICAL INC]

## (undated) DEVICE — SURGICAL PROCEDURE PACK BASIC ST FRANCIS

## (undated) DEVICE — 3M™ TEGADERM™ TRANSPARENT FILM DRESSING FRAME STYLE, 1626W, 4 IN X 4-3/4 IN (10 CM X 12 CM), 50/CT 4CT/CASE: Brand: 3M™ TEGADERM™

## (undated) DEVICE — HYPODERMIC SAFETY NEEDLE: Brand: MAGELLAN

## (undated) DEVICE — INTENDED FOR TISSUE SEPARATION, AND OTHER PROCEDURES THAT REQUIRE A SHARP SURGICAL BLADE TO PUNCTURE OR CUT.: Brand: BARD-PARKER ® STAINLESS STEEL BLADES

## (undated) DEVICE — SLING ORTH AD L50IN 90 225LB EL TO WRST SFT COT SPANDEX

## (undated) DEVICE — MASTISOL ADHESIVE LIQ 2/3ML

## (undated) DEVICE — BANDAGE COMPR SELF ADH 5 YDX4 IN TAN STRL PREMIERPRO LF

## (undated) DEVICE — SLING ARM AD ULT

## (undated) DEVICE — TUBING, SUCTION, 1/4" X 10', STRAIGHT: Brand: MEDLINE

## (undated) DEVICE — [RESECTOR CUTTER, ARTHROSCOPIC SHAVER BLADE,  DO NOT RESTERILIZE,  DO NOT USE IF PACKAGE IS DAMAGED,  KEEP DRY,  KEEP AWAY FROM SUNLIGHT]: Brand: FORMULA

## (undated) DEVICE — SYR 50ML LR LCK 1ML GRAD NSAF --

## (undated) DEVICE — (D)PREP SKN CHLRAPRP APPL 26ML -- CONVERT TO ITEM 371833

## (undated) DEVICE — DRAPE,TOP,102X53,STERILE: Brand: MEDLINE

## (undated) DEVICE — NDL SPNE QNCKE 18GX3.5IN LF --

## (undated) DEVICE — (D)STRIP SKN CLSR 0.5X4IN WHT --

## (undated) DEVICE — DRAPE,SHOULDER,BEACH CHAIR,STERILE: Brand: MEDLINE

## (undated) DEVICE — ACROMIOPLASTY ELECTRODE (ELECTRODE ONLY): Brand: CONMED

## (undated) DEVICE — STOCKINETTE TUBE 9X48 -- MEDICHOICE

## (undated) DEVICE — CLEAR-TRAC THREADED CANNULA, 8.0                                    MM I.D., 76 MM LONG, GREEN, LATEX SEAL: Brand: CLEAR-TRAC

## (undated) DEVICE — REM POLYHESIVE ADULT PATIENT RETURN ELECTRODE: Brand: VALLEYLAB

## (undated) DEVICE — GUARDIAN LVC: Brand: GUARDIAN

## (undated) DEVICE — CARDINAL HEALTH FLEXIBLE LIGHT HANDLE COVER: Brand: CARDINAL HEALTH

## (undated) DEVICE — SUTURE MCRYL SZ 3-0 L27IN ABSRB UD L24MM PS-1 3/8 CIR PRIM Y936H

## (undated) DEVICE — UTILITY MARKER,BLACK WITH LABELS: Brand: DEVON